# Patient Record
Sex: FEMALE | Race: WHITE | Employment: OTHER | URBAN - METROPOLITAN AREA
[De-identification: names, ages, dates, MRNs, and addresses within clinical notes are randomized per-mention and may not be internally consistent; named-entity substitution may affect disease eponyms.]

---

## 2017-02-01 RX ORDER — TAMOXIFEN CITRATE 20 MG/1
20 TABLET ORAL DAILY
Qty: 90 TAB | Refills: 3 | Status: SHIPPED | OUTPATIENT
Start: 2017-02-01 | End: 2018-03-28 | Stop reason: SDUPTHER

## 2017-03-13 ENCOUNTER — HOSPITAL ENCOUNTER (OUTPATIENT)
Dept: MAMMOGRAPHY | Age: 72
Discharge: HOME | End: 2017-03-13
Attending: NURSE PRACTITIONER
Payer: MEDICARE

## 2017-03-13 DIAGNOSIS — C50.411 MALIGNANT NEOPLASM OF UPPER-OUTER QUADRANT OF RIGHT FEMALE BREAST (HCC): ICD-10-CM

## 2017-03-13 DIAGNOSIS — Z78.0 POST-MENOPAUSAL: ICD-10-CM

## 2017-03-13 PROCEDURE — 77080 DXA BONE DENSITY AXIAL: CPT

## 2017-05-15 ENCOUNTER — OFFICE VISIT (OUTPATIENT)
Dept: ONCOLOGY | Age: 72
End: 2017-05-15

## 2017-05-15 ENCOUNTER — DOCUMENTATION ONLY (OUTPATIENT)
Dept: ONCOLOGY | Age: 72
End: 2017-05-15

## 2017-05-15 VITALS
HEIGHT: 61 IN | OXYGEN SATURATION: 96 % | HEART RATE: 72 BPM | WEIGHT: 180.6 LBS | DIASTOLIC BLOOD PRESSURE: 69 MMHG | SYSTOLIC BLOOD PRESSURE: 144 MMHG | TEMPERATURE: 97 F | RESPIRATION RATE: 20 BRPM | BODY MASS INDEX: 34.1 KG/M2

## 2017-05-15 DIAGNOSIS — M79.604 PAIN OF RIGHT LOWER EXTREMITY: ICD-10-CM

## 2017-05-15 DIAGNOSIS — I89.0 LYMPHEDEMA: ICD-10-CM

## 2017-05-15 DIAGNOSIS — I10 BENIGN ESSENTIAL HTN: ICD-10-CM

## 2017-05-15 DIAGNOSIS — C50.411 MALIGNANT NEOPLASM OF UPPER-OUTER QUADRANT OF RIGHT FEMALE BREAST (HCC): Primary | ICD-10-CM

## 2017-05-15 RX ORDER — LATANOPROST 50 UG/ML
SOLUTION/ DROPS OPHTHALMIC
COMMUNITY
Start: 2017-04-12 | End: 2017-11-20 | Stop reason: ALTCHOICE

## 2017-05-15 RX ORDER — ROSUVASTATIN CALCIUM 10 MG/1
10 TABLET, COATED ORAL
COMMUNITY

## 2017-05-15 NOTE — PROGRESS NOTES
[address], [address] [facility]  [address]  W: [phone]  F: [phone]     f/u HEME/ONC CONSULT    Reason for visit: evaluation for treatment for   Breast Cancer    Consulting physician:  Dr. Emanuel Brownlee    HPI:   Priya Noland is a 66 y.o.  female who I was asked to see in consultation at the request of Dr. Carmela Saravia for evaluation for systemic therapy for breast cancer. She had left sided breast cancer in 1992, path report not available to me, treated with lumpectomy for a \"walnut sized tumor,\" 0/32 LN involved, followed by radiation and chemotherapy (for possibly 6 months, so maybe CMF?) and then tamoxifen for 2 years (stopped by her due to not having evidence that more than 2 years was beneficial). She had an abnormal mammogram leading to a RIGHT breast biopsy on 1/19/2015, 0.7 cm of invasive carcinoma with mucinous features, gr 1, with DCIS, ER + at 100% and IA + at 30% for both invasive and DCIS component, HER 2 negative at IHC 0, ki67 18%. Right lumpectomy on 2/10/15 shows invasive ductal carcinoma, gr 1, no LVI, 0/2 LN involved, solid and cribriform DCIS focally present, negative but close DCIS margin. S/p xrt 3/24/15-4/14/15  Started anastrozole 4/2015-4/19/16 stopped due to joint pain  Restarted anastrozole 5/1/16-6/13/16 stopped due to joint pain  Letrozole 6/27/16-7/18/16  Tamoxifen 8/1/16-    Interval history: In today for follow up. Today complains of: gr 1 fatigue, gr 1 insomnia, gr 1 hot flashes, gr 2 anxiety,  Occasional sob, gr 1 neuropathy. Complains of right lower leg and lower groin area pain when she lies down, 2/10. Taking Tamoxifen, tolerating well. Her son-in-law passed away this past week due to lung cancer. DX   Encounter Diagnoses   Name Primary?     Malignant neoplasm of upper-outer quadrant of right female breast ([address] Yes    Pain of right lower extremity     Benign essential HTN     Lymphedema         Past Medical History:   Diagnosis Date    Breast CA [facility]) 2015    right breast    Cancer ([address] 1992    LEFT breast  followed by chemo and Tamoxifen    GERD (gastroesophageal reflux disease)     Hypercholesterolemia     Hypertension      Past Surgical History:   Procedure Laterality Date    HX BREAST LUMPECTOMY  1992    LEFT    HX HYSTERECTOMY      Took out part of both ovaries    HX OOPHORECTOMY Bilateral     partial    HX TONSILLECTOMY      age 25     Social History     Social History    Marital status:      Spouse name: N/A    Number of children: N/A    Years of education: N/A     Social History Main Topics    Smoking status: Never Smoker    Smokeless tobacco: Never Used    Alcohol use 0.5 oz/week     1 Cans of beer per week      Comment: beer    Drug use: No    Sexual activity: Not Asked     Other Topics Concern    None     Social History Narrative     Family History   Problem Relation Age of Onset    Cancer Mother      Lung CA, was a smoker    Heart Disease Father        Current Outpatient Prescriptions   Medication Sig Dispense Refill    latanoprost (XALATAN) 0.005 % ophthalmic solution       rosuvastatin (CRESTOR) 10 mg tablet Take 10 mg by mouth nightly.  tamoxifen (NOLVADEX) 20 mg tablet Take 1 Tab by mouth daily. 90 Tab 3    vitamin e 400 unit tab Take  by mouth daily.  ascorbic acid (VITAMIN C) 500 mg tablet Take 500 mg by mouth daily.  metoprolol succinate (TOPROL-XL) 50 mg XL tablet Take 75 mg by mouth daily.  GLUCOSAMINE HCL/CHONDR EWBSTER A NA (OSTEO BI-FLEX PO) Take 2 Tabs by mouth daily.  aspirin 81 mg chewable tablet Take 81 mg by mouth daily.  calcium-cholecalciferol, d3, (CALCIUM 600 + D) 600-125 mg-unit tab Take 2 Tabs by mouth Daily (before breakfast).  omeprazole (PRILOSEC) 10 mg capsule Take 10 mg by mouth daily.  naproxen sodium (ALEVE) 220 mg cap Take  by mouth.       diazepam (VALIUM) 5 mg tablet Take 1 Tab by mouth every six (6) hours as needed for Anxiety. 25 Tab 0       Allergies   Allergen Reactions    Sulfa (Sulfonamide Antibiotics) Rash       Review of Systems    A comprehensive review of systems was performed and all systems were negative except for HPI and for the symptom report form, reviewed and scanned in.    Objective:  Physical Exam:  Visit Vitals    /69    Pulse 72    Temp 97 °F (36.1 °C) (Temporal)    Resp 20    Ht 5' 1\" (1.549 m)    Wt 180 lb 9.6 oz (81.9 kg)    SpO2 96%    BMI 34.12 kg/m2       General:  Alert, cooperative, no distress, appears stated age. Head:  Normocephalic, without obvious abnormality, atraumatic. Eyes:  Conjunctivae/corneas clear. PERRL, EOMs intact. Throat: Lips, mucosa, and tongue normal.    Neck: Supple, symmetrical, trachea midline, no adenopathy, thyroid: no enlargement/tenderness/nodules   Back:   Symmetric, no curvature. ROM normal. No CVA tenderness. Lungs:   Clear to auscultation bilaterally. Chest wall:  No tenderness or deformity. Heart:  Regular rate and rhythm, S1, S2 normal, no murmur, click, rub or gallop. Abdomen:   Soft, non-tender. Bowel sounds normal. No masses,  No organomegaly. Extremities: Extremities normal, atraumatic, no cyanosis. Left arm lymphedema. Skin: Skin color, texture, turgor normal. No rashes or lesions. Lymph nodes: Cervical, supraclavicular, and axillary nodes normal.   Neurologic: CNII-XII intact. Diagnostic Imaging     No results found for this or any previous visit. 3/10/15 dexa  REPORT:  Bone Mineral Density  Indication: screening for osteoporosis  Age: 68  Sex: Female. Menopause status: postmenopausal.  Hormone replacement therapy: No   Risk factors for fall: frequent falls   Risk factors for osteoporosis: AI  Current medication for osteoporosis: Calcium and vitamin D.   Comparison: 2002  Technique: Imaging was performed on the [address] [place] [address] meta-analysis fracture risk calculator (FRAX) analysis   was performed for 10 year fracture risk probability assessment  Excluded sites: 0  Findings:  Fractures identified on Lateral scanogram: 0  Lumbar spine: L1-4  Bone mineral density (gm/cm2): 1.250  % of peak bone mass: 105  % for age matched controls: 80  T score: 0.4  Z score: 1.5  Hip: Right femoral neck  Bone mineral density (gm/cm2): 0.993  % of peak bone mass: 96  % for age matched controls: 80  T score: -0.3  Z score: 1  IMPRESSION:  This patient is normal using the World Health Organization criteria  As compared to the prior study, there has been a significant 5.3% increase   in the left total hip, a significant 19.2% increase in the lumbar spine  10 year probability of major osteoporotic fracture: 7.2%  10 year probability of hip fracture: 0.5%    10/19/15 bilateral mammogram  FINDINGS: Bilateral digital diagnostic mammography was performed, and is   interpreted in conjunction with a computer assisted detection (CAD) system. Within the upper outer quadrant of the right breast, there is a large   rounded mass, possibly representing a postoperative seroma. The left breast   postoperative changes are stable. No suspicious calcifications are   identified. There is no skin thickening or nipple retraction. Targeted evaluation of the upper-outer quadrant of the right breast is   performed. Within the 10:00 position of the right breast, there is a 4.8 CM   by 3.4 CM by 5.9 CM low circumscribed complex avascular collection, likely   representing a seroma. IMPRESSION: BI-RADS 3. Probable benign finding. Recommend right breast 6   month short interval followup, unless earlier clinical indication. Findings   were discussed with the patient at the time of interpretation. 4/18/16 R mammo   IMPRESSION:  1. BI-RADS category 3, probably benign. 2. The patient should return in 6 months for bilateral mammography. 3. She was informed. 10/31/16 [place] mammo: negative, f/u in 1 year.      3/13/17 dexa  Lumbar spine: L1-L4  Bone mineral density (gm/cm2): 1.183  % of peak bone mass: 99  % for age matched controls: 80  T score: -0.1  Z score: 1.1      Total hip: Left  Bone mineral density (gm/cm2): 1.064  % of peak bone mass: 106  % for age matched controls: 80  T score: 0.4  Z score: 1.7     Femoral neck: Left  Bone mineral density (gm/cm2): 0.961  % of peak bone mass: 93  % for age matched controls: 80  T score: -0.6  Z score: 0.9     Forearm: Left  Bone mineral density (gm/cm2): 0.789  % of peak bone mass: 90  % for age matched controls: 80  T score: -1.0  Z score: 1.1                  IMPRESSION  Impression:      This patient is normal using the World Health Organization criteria    Lab Results  No results found for: WBC    No results found for: NA    . Assessment/Plan:  66 y.o. female with [id number] right breast cancer, ER +, ND +, HER 2 negative, gr 1, 0/2 LN involved, 0.7 cm. PS 0    1. Right Breast cancer stage: IA    Hormonal therapy: administered     With no further therapy, her RR is 14%. Treatment with endocrine therapy will decrease her RR to 7%. Chemotherapy would only add an 1% benefit. No evidence of recurrence, continue tamoxifen    Bilateral mammogram due in October 2017, ordered. Last eye exam: 1/12/17  Last gyn exam: s/p hysterectomy    Will see her back in 6 months for follow up. 2. Hypertension: stable. On metroprolol    3. Emotional well being: She is coping well with her disease and has great support. 4. RLE pain:  Will obtain RLE doppler, though likely a chronic pain. 5. Lung nodule: Dr. Jess Viera following. Previously, discussed that she gets annual CXR to follow a nodule in her lungs. Discussed low dose CT scan to follow if her risk is high enough. We discussed getting a CT today however patient is not interested at this time. 6. Back pain: improved, using aleve with relief. Pt not interested in seeing orthopedics or getting scan at this time.  She will call us if this worsens. Will monitor. 7. Lymphedema: in left arm. Refer to lymphedema today. Thank you for this consult. All of the patient's questions were answered today. There are no Patient Instructions on file for this visit. Follow-up Disposition:  Return in about 6 months (around 11/15/2017).     Lan Aviles MD

## 2017-05-15 NOTE — MR AVS SNAPSHOT
Visit Information Date & Time Provider Department Dept. Phone Encounter #  
 5/15/2017  2:15 PM Nathaniel Mcduffie at [address] [place] [address] Rd [id number] Follow-up Instructions Return in about 6 months (around 11/15/2017). Follow-up and Disposition History Your Appointments 11/27/2017  8:00 AM  
ESTABLISHED PATIENT with Maxine Foreman MD  
[facility]) Appt Note: 6 mo fu Volanda Dynes [address] [facility], [address] [facility] [id number]  
[phone]  
  
   
 [address] [facility], [address] [facility] Upcoming Health Maintenance Date Due DTaP/Tdap/Td series (1 - Tdap) 10/1/1964 FOBT Q 1 YEAR AGE 50-75 10/1/1993 ZOSTER VACCINE AGE 60> 10/1/2003 GLAUCOMA SCREENING Q2Y 10/1/2008 Pneumococcal 65+ High/Highest Risk (1 of 2 - PCV13) 10/1/2008 MEDICARE YEARLY EXAM 10/1/2008 INFLUENZA AGE 9 TO ADULT 8/1/2017 BREAST CANCER SCRN MAMMOGRAM 10/31/2018 Allergies as of 5/15/2017  Review Complete On: 5/15/2017 By: Maxine Foreman MD  
  
 Severity Noted Reaction Type Reactions Sulfa (Sulfonamide Antibiotics)  10/21/2011    Rash Current Immunizations  Never Reviewed No immunizations on file. Not reviewed this visit You Were Diagnosed With   
  
 Codes Comments Malignant neoplasm of upper-outer quadrant of right female breast ([facility] [address]    -  Primary ICD-10-CM: C50.411 ICD-9-CM: 174.4 Pain of right lower extremity     ICD-10-CM: M79.604 ICD-9-CM: 729.5 Benign essential HTN     ICD-10-CM: I10 
ICD-9-CM: 401.1 Lymphedema     ICD-10-CM: I89.0 ICD-9-CM: 360.8 Vitals BP Pulse Temp Resp Height(growth percentile) Weight(growth percentile) 144/69 72 97 °F (36.1 °C) (Temporal) 20 5' 1\" (1.549 m) 180 lb 9.6 oz (81.9 kg) SpO2 BMI OB Status Smoking Status 96% 34.12 kg/m2 Hysterectomy Never Smoker Vitals History BMI and BSA Data Body Mass Index Body Surface Area  
 34.12 kg/m 2 1.88 m 2 Preferred Pharmacy Pharmacy Name Phone [facility] [address] [place] [phone] Your Updated Medication List  
  
   
This list is accurate as of: 5/15/17  2:51 PM.  Always use your most recent med list.  
  
  
  
  
 ALEVE 220 mg Cap Generic drug:  naproxen sodium Take  by mouth. ascorbic acid (vitamin C) 500 mg tablet Commonly known as:  VITAMIN C Take 500 mg by mouth daily. aspirin 81 mg chewable tablet Take 81 mg by mouth daily. CALCIUM 600 + D 600-125 mg-unit Tab Generic drug:  calcium-cholecalciferol (d3) Take 2 Tabs by mouth Daily (before breakfast). diazePAM 5 mg tablet Commonly known as:  VALIUM Take 1 Tab by mouth every six (6) hours as needed for Anxiety. latanoprost 0.005 % ophthalmic solution Commonly known as:  XALATAN  
  
 metoprolol succinate 50 mg XL tablet Commonly known as:  TOPROL-XL Take 75 mg by mouth daily. omeprazole 10 mg capsule Commonly known as:  PRILOSEC Take 10 mg by mouth daily. OSTEO BI-FLEX PO Take 2 Tabs by mouth daily. rosuvastatin 10 mg tablet Commonly known as:  CRESTOR Take 10 mg by mouth nightly. tamoxifen 20 mg tablet Commonly known as:  NOLVADEX Take 1 Tab by mouth daily. vitamin e 400 unit Tab Take  by mouth daily. Follow-up Instructions Return in about 6 months (around 11/15/2017). To-Do List   
 05/17/2017 Imaging:  DUPLEX LOWER EXT VENOUS RIGHT   
  
 10/31/2017 Imaging:  LACEY MAMMOGRAM DIAG BILAT SAME DAY INCL CAD Introducing [facility]! Dear Ade Saravia: Thank you for requesting a [employer] account. Our records indicate that you have previously registered for a [employer] account but its currently inactive. Please call our [employer] support line at [phone]. Additional Information If you have questions, please visit the Frequently Asked Questions section of the [employer] website at [email or web address] Remember, [employer] is NOT to be used for urgent needs. For medical emergencies, dial 911. Now available from your iPhone and Android! Please provide this summary of care documentation to your next provider. Your primary care clinician is listed as Carlotta Palmer. If you have any questions after today's visit, please call [phone].

## 2017-05-22 ENCOUNTER — HOSPITAL ENCOUNTER (OUTPATIENT)
Dept: ULTRASOUND IMAGING | Age: 72
Discharge: HOME | End: 2017-05-22
Attending: INTERNAL MEDICINE
Payer: MEDICARE

## 2017-05-22 DIAGNOSIS — C50.411 MALIGNANT NEOPLASM OF UPPER-OUTER QUADRANT OF RIGHT FEMALE BREAST (HCC): ICD-10-CM

## 2017-05-22 DIAGNOSIS — M79.604 PAIN OF RIGHT LOWER EXTREMITY: ICD-10-CM

## 2017-05-22 PROCEDURE — 93971 EXTREMITY STUDY: CPT

## 2017-11-07 DIAGNOSIS — C50.411 MALIGNANT NEOPLASM OF UPPER-OUTER QUADRANT OF RIGHT FEMALE BREAST, UNSPECIFIED ESTROGEN RECEPTOR STATUS (HCC): Primary | ICD-10-CM

## 2017-11-20 ENCOUNTER — HOSPITAL ENCOUNTER (OUTPATIENT)
Dept: MAMMOGRAPHY | Age: 72
Discharge: HOME | End: 2017-11-20
Attending: SURGERY
Payer: MEDICARE

## 2017-11-20 ENCOUNTER — OFFICE VISIT (OUTPATIENT)
Dept: SURGERY | Age: 72
End: 2017-11-20

## 2017-11-20 VITALS
WEIGHT: 181 LBS | DIASTOLIC BLOOD PRESSURE: 68 MMHG | HEART RATE: 56 BPM | BODY MASS INDEX: 34.17 KG/M2 | SYSTOLIC BLOOD PRESSURE: 143 MMHG | HEIGHT: 61 IN

## 2017-11-20 DIAGNOSIS — Z17.0 MALIGNANT NEOPLASM OF UPPER-OUTER QUADRANT OF RIGHT BREAST IN FEMALE, ESTROGEN RECEPTOR POSITIVE (HCC): Primary | ICD-10-CM

## 2017-11-20 DIAGNOSIS — Z85.3 HISTORY OF LEFT BREAST CANCER: ICD-10-CM

## 2017-11-20 DIAGNOSIS — C50.411 MALIGNANT NEOPLASM OF UPPER-OUTER QUADRANT OF RIGHT BREAST IN FEMALE, ESTROGEN RECEPTOR POSITIVE (HCC): Primary | ICD-10-CM

## 2017-11-20 DIAGNOSIS — C50.411 MALIGNANT NEOPLASM OF UPPER-OUTER QUADRANT OF RIGHT FEMALE BREAST, UNSPECIFIED ESTROGEN RECEPTOR STATUS (HCC): ICD-10-CM

## 2017-11-20 PROCEDURE — 77066 DX MAMMO INCL CAD BI: CPT

## 2017-11-20 NOTE — PROGRESS NOTES
HISTORY OF PRESENT ILLNESS  Carolyn Lyles is a 74 y.o. female. HPI  ESTABLISHED patient here for follow-up of RIGHT breast cancer, S/P lumpectomy in 2015. Also has a history of LEFT breast CA in 1992. Is currently on Tamoxifen and is doing well on this. Could not tolerate the AIs due to bone pain. Has no complaints today. Feels well other than some low back pain.       02/10/2015: RIGHT lumpectomy and SNBx of gr1 invasive ductal carcinoma, 0.7 cm with DCIS. 0/2 LN involved. ER+100%/KS+30% Her2-. Clear margins.   03/24/2015 - /4/24/2015: S/p XRT  04/2015 - 04/19/2016: Anastrozole (stopped due to joint pain)  05/01/2016 - 06/13/2016: Restarted anastrozole (stopped due to joint pain)  06/27/2016 - 07/18/206: Letrozole   08/01/2016 - : Tamoxifen    BILATERAL diagnostic mammogram today at [address] [place] [address], BIRADS 2, benign. Past Medical History:   Diagnosis Date    Breast CA [facility]) 2015    right breast    Cancer ([address] 1992    LEFT breast  followed by chemo and Tamoxifen    GERD (gastroesophageal reflux disease)     Hypercholesterolemia     Hypertension        Past Surgical History:   Procedure Laterality Date    HX BREAST BIOPSY Left 1992    dcis    HX BREAST BIOPSY Right 2015    dcis    HX BREAST LUMPECTOMY  1992    LEFT    HX HYSTERECTOMY      Took out part of both ovaries    HX OOPHORECTOMY Bilateral     partial    HX TONSILLECTOMY      age 25       Social History     Social History    Marital status:      Spouse name: N/A    Number of children: N/A    Years of education: N/A     Occupational History    Not on file.      Social History Main Topics    Smoking status: Never Smoker    Smokeless tobacco: Never Used    Alcohol use 0.5 oz/week     1 Cans of beer per week      Comment: beer    Drug use: No    Sexual activity: Not on file     Other Topics Concern    Not on file     Social History Narrative       Current Outpatient Prescriptions on File Prior to Visit   Medication Sig Dispense Refill    rosuvastatin (CRESTOR) 10 mg tablet Take 10 mg by mouth nightly.  tamoxifen (NOLVADEX) 20 mg tablet Take 1 Tab by mouth daily. 90 Tab 3    vitamin e 400 unit tab Take  by mouth daily.  ascorbic acid (VITAMIN C) 500 mg tablet Take 500 mg by mouth daily.  metoprolol succinate (TOPROL-XL) 50 mg XL tablet Take 75 mg by mouth daily.  naproxen sodium (ALEVE) 220 mg cap Take  by mouth.  GLUCOSAMINE HCL/CHONDR WEBSTER A NA (OSTEO BI-FLEX PO) Take 2 Tabs by mouth daily.  aspirin 81 mg chewable tablet Take 81 mg by mouth daily.  calcium-cholecalciferol, d3, (CALCIUM 600 + D) 600-125 mg-unit tab Take 2 Tabs by mouth Daily (before breakfast).  omeprazole (PRILOSEC) 10 mg capsule Take 10 mg by mouth daily.  diazepam (VALIUM) 5 mg tablet Take 1 Tab by mouth every six (6) hours as needed for Anxiety. 25 Tab 0     No current facility-administered medications on file prior to visit. Allergies   Allergen Reactions    Sulfa (Sulfonamide Antibiotics) Rash       OB History      Para Term  AB Living    3 3        SAB TAB Ectopic Molar Multiple Live Births    0             Obstetric Comments    Menarche:  4. LMP: 30.  # of Children:  3. Age at Delivery of First Child:  11.   Hysterectomy/oophorectomy:  YES/NO. Breast Bx:  Yes. Hx of Breast Feeding:  No.  BCP:  No. Hormone therapy:  No.          ROS  Constitutional: Negative    HENT: Negative. Eyes: Negative. Respiratory: Negative. Cardiovascular: Negative. Gastrointestinal: Negative. Genitourinary: Negative. Musculoskeletal: Negative. Skin: Negative. Neurological: Negative. Endo/Heme/Allergies: Negative. Psychiatric/Behavioral: Negative. Physical Exam   Cardiovascular: Normal rate and normal heart sounds. Pulmonary/Chest: Breath sounds normal. Right breast exhibits no inverted nipple, no mass, no nipple discharge, no skin change and no tenderness.  Left breast exhibits no inverted nipple, no mass, no nipple discharge, no skin change and no tenderness. Breasts are symmetrical.       Lymphadenopathy:        Right cervical: No superficial cervical, no deep cervical and no posterior cervical adenopathy present. Left cervical: No superficial cervical, no deep cervical and no posterior cervical adenopathy present. Right axillary: No pectoral and no lateral adenopathy present. Left axillary: No pectoral and no lateral adenopathy present. ASSESSMENT and PLAN    ICD-10-CM ICD-9-CM    1. Malignant neoplasm of upper-outer quadrant of right breast in female, estrogen receptor positive (HCC) C50.411 174.4     Z17.0 V86.0    2. History of left breast cancer Z85.3 V10.3      Pt with hx of BILATERAL breast cancer and doing well. Today's mammo looks good. Repeat mammo and f/u with Mallory Ojeda NP in 1 year. This plan was reviewed with the patient and patient agrees. All questions were answered.     Written by [facility], as dictated by Dr. Mack Cárdenas MD.

## 2017-11-20 NOTE — COMMUNICATION BODY
HISTORY OF PRESENT ILLNESS  Rajani Hatfield is a 74 y.o. female. HPI  ESTABLISHED patient here for follow-up of RIGHT breast cancer, S/P lumpectomy in 2015. Also has a history of LEFT breast CA in 1992. Is currently on Tamoxifen and is doing well on this. Could not tolerate the AIs due to bone pain. Has no complaints today. Feels well other than some low back pain.       02/10/2015: RIGHT lumpectomy and SNBx of gr1 invasive ductal carcinoma, 0.7 cm with DCIS. 0/2 LN involved. ER+100%/[place]+30% Her2-. Clear margins.   03/24/2015 - /4/24/2015: S/p XRT  04/2015 - 04/19/2016: Anastrozole (stopped due to joint pain)  05/01/2016 - 06/13/2016: Restarted anastrozole (stopped due to joint pain)  06/27/2016 - 07/18/206: Letrozole   08/01/2016 - : Tamoxifen    BILATERAL diagnostic mammogram today at [address] [place] [address], BIRADS 2, benign. Past Medical History:   Diagnosis Date    Breast CA [facility]) 2015    right breast    Cancer ([address] 1992    LEFT breast  followed by chemo and Tamoxifen    GERD (gastroesophageal reflux disease)     Hypercholesterolemia     Hypertension        Past Surgical History:   Procedure Laterality Date    HX BREAST BIOPSY Left 1992    dcis    HX BREAST BIOPSY Right 2015    dcis    HX BREAST LUMPECTOMY  1992    LEFT    HX HYSTERECTOMY      Took out part of both ovaries    HX OOPHORECTOMY Bilateral     partial    HX TONSILLECTOMY      age 25       Social History     Social History    Marital status:      Spouse name: N/A    Number of children: N/A    Years of education: N/A     Occupational History    Not on file.      Social History Main Topics    Smoking status: Never Smoker    Smokeless tobacco: Never Used    Alcohol use 0.5 oz/week     1 Cans of beer per week      Comment: beer    Drug use: No    Sexual activity: Not on file     Other Topics Concern    Not on file     Social History Narrative       Current Outpatient Prescriptions on File Prior to Visit   Medication Sig Dispense Refill    rosuvastatin (CRESTOR) 10 mg tablet Take 10 mg by mouth nightly.  tamoxifen (NOLVADEX) 20 mg tablet Take 1 Tab by mouth daily. 90 Tab 3    vitamin e 400 unit tab Take  by mouth daily.  ascorbic acid (VITAMIN C) 500 mg tablet Take 500 mg by mouth daily.  metoprolol succinate (TOPROL-XL) 50 mg XL tablet Take 75 mg by mouth daily.  naproxen sodium (ALEVE) 220 mg cap Take  by mouth.  GLUCOSAMINE HCL/CHONDR WEBSTER A NA (OSTEO BI-FLEX PO) Take 2 Tabs by mouth daily.  aspirin 81 mg chewable tablet Take 81 mg by mouth daily.  calcium-cholecalciferol, d3, (CALCIUM 600 + D) 600-125 mg-unit tab Take 2 Tabs by mouth Daily (before breakfast).  omeprazole (PRILOSEC) 10 mg capsule Take 10 mg by mouth daily.  diazepam (VALIUM) 5 mg tablet Take 1 Tab by mouth every six (6) hours as needed for Anxiety. 25 Tab 0     No current facility-administered medications on file prior to visit. Allergies   Allergen Reactions    Sulfa (Sulfonamide Antibiotics) Rash       OB History      Para Term  AB Living    3 3        SAB TAB Ectopic Molar Multiple Live Births    0             Obstetric Comments    Menarche:  4. LMP: 28.  # of Children:  3. Age at Delivery of First Child:  11.   Hysterectomy/oophorectomy:  YES/NO. Breast Bx:  Yes. Hx of Breast Feeding:  No.  BCP:  No. Hormone therapy:  No.          ROS  Constitutional: Negative    HENT: Negative. Eyes: Negative. Respiratory: Negative. Cardiovascular: Negative. Gastrointestinal: Negative. Genitourinary: Negative. Musculoskeletal: Negative. Skin: Negative. Neurological: Negative. Endo/Heme/Allergies: Negative. Psychiatric/Behavioral: Negative. Physical Exam   Cardiovascular: Normal rate and normal heart sounds. Pulmonary/Chest: Breath sounds normal. Right breast exhibits no inverted nipple, no mass, no nipple discharge, no skin change and no tenderness.  Left breast exhibits no inverted nipple, no mass, no nipple discharge, no skin change and no tenderness. Breasts are symmetrical.       Lymphadenopathy:        Right cervical: No superficial cervical, no deep cervical and no posterior cervical adenopathy present. Left cervical: No superficial cervical, no deep cervical and no posterior cervical adenopathy present. Right axillary: No pectoral and no lateral adenopathy present. Left axillary: No pectoral and no lateral adenopathy present. ASSESSMENT and PLAN    ICD-10-CM ICD-9-CM    1. Malignant neoplasm of upper-outer quadrant of right breast in female, estrogen receptor positive (HCC) C50.411 174.4     Z17.0 V86.0    2. History of left breast cancer Z85.3 V10.3      Pt with hx of BILATERAL breast cancer and doing well. Today's mammo looks good. Repeat mammo and f/u with Naveed Archibald NP in 1 year. This plan was reviewed with the patient and patient agrees. All questions were answered.     Written by Vadim Caban, as dictated by Dr. Claudeen Kanner, MD.

## 2017-11-20 NOTE — PROGRESS NOTES
HISTORY OF PRESENT ILLNESS  Alfredo Gary is a 74 y.o. female. HPI   ESTABLISHED patient here for follow-up of RIGHT breast cancer, S/P lumpectomy in 2015. Also has a history of LEFT breast CA in 1992. Is currently on Tamoxifen and is doing well on this. Could not tolerate the AIs due to bone pain. Has no complaints today. Feels well other than some low back pain. 02/10/2015: RIGHT lumpectomy and SNBx of gr1 invasive ductal carcinoma, 0.7 cm with DCIS. 0/2 LN involved. ER+100%/[place]+30% Her2-. Clear margins.   03/24/2015 - /4/24/2015: S/p XRT  04/2015 - 04/19/2016: Anastrozole (stopped due to joint pain)  05/01/2016 - 06/13/2016: Restarted anastrozole (stopped due to joint pain)  06/27/2016 - 07/18/206: Letrozole   08/01/2016 - : Tamoxifen  BILATERAL diagnostic mammogram today at [address] [place] [address], BIRADS 2, benign.       ROS    Physical Exam    ASSESSMENT and PLAN  {ASSESSMENT/PLAN:75420}

## 2017-11-20 NOTE — MR AVS SNAPSHOT
Visit Information Date & Time Provider Department Dept. Phone Encounter #  
 11/20/2017  2:41 AM Evon Whaley MD [facility] [phone] [id number] Follow-up Instructions Return in about 1 year (around 11/20/2018) for with Marya Tran NP, with mammogram.  
 Follow-up and Disposition History Your Appointments 11/27/2017  8:00 AM  
ESTABLISHED PATIENT with Kelsie Rodríguez MD  
[facility] at [address]) Appt Note: 6 mo fu Elnor Patches [address] [facility], [address] [place] [address] [facility] [address]  
[phone]  
  
   
 [address] [facility], [address]  
  
    
 11/19/2018 11:40 AM  
Follow Up with Evon Whaley MD  
[facility] [address] [place] [address]) Appt Note: annual follow up per /cp?/[facility] [address] [facility] 99 [address]  
  
   
 [place] [address] [place] [address] [facility] Upcoming Health Maintenance Date Due DTaP/Tdap/Td series (1 - Tdap) 10/1/1964 FOBT Q 1 YEAR AGE 50-75 10/1/1993 ZOSTER VACCINE AGE 60> 8/1/2003 GLAUCOMA SCREENING Q2Y 10/1/2008 Pneumococcal 65+ High/Highest Risk (1 of 2 - PCV13) 10/1/2008 MEDICARE YEARLY EXAM 10/1/2008 Influenza Age 5 to Adult 8/1/2017 Allergies as of 11/20/2017  Review Complete On: 08/62/5482 By: Evon Whaley MD  
  
 Severity Noted Reaction Type Reactions Sulfa (Sulfonamide Antibiotics)  10/21/2011    Rash Current Immunizations  Never Reviewed No immunizations on file. Not reviewed this visit You Were Diagnosed With   
  
 Codes Comments Malignant neoplasm of upper-outer quadrant of right breast in female, estrogen receptor positive ([facility] [address]    -  Primary ICD-10-CM: C50.411, Z17.0 ICD-9-CM: 174.4, V86.0 History of left breast cancer     ICD-10-CM: Z85.3 ICD-9-CM: V10.3 Vitals BP Pulse Height(growth percentile) Weight(growth percentile) BMI OB Status 143/68 (BP 1 Location: Right arm, BP Patient Position: Sitting) (!) 56 5' 1\" (1.549 m) 181 lb (82.1 kg) 34.2 kg/m2 Hysterectomy Smoking Status Never Smoker BMI and BSA Data Body Mass Index Body Surface Area  
 34.2 kg/m 2 1.88 m 2 Preferred Pharmacy Pharmacy Name Phone [facility] [address] [place] [phone] Your Updated Medication List  
  
   
This list is accurate as of: 11/20/17  1:42 PM.  Always use your most recent med list.  
  
  
  
  
 ALEVE 220 mg Cap Generic drug:  naproxen sodium Take  by mouth. ascorbic acid (vitamin C) 500 mg tablet Commonly known as:  VITAMIN C Take 500 mg by mouth daily. aspirin 81 mg chewable tablet Take 81 mg by mouth daily. CALCIUM 600 + D 600-125 mg-unit Tab Generic drug:  calcium-cholecalciferol (d3) Take 2 Tabs by mouth Daily (before breakfast). diazePAM 5 mg tablet Commonly known as:  VALIUM Take 1 Tab by mouth every six (6) hours as needed for Anxiety. FLUAD 1339-3695 (65 YR UP)(PF) Syrg injection Generic drug:  influenza vaccine 2017-18 (65 yrs+)(PF)  
inject 0.5 milliliter intramuscularly LUMIGAN OP Apply  to eye.  
  
 metoprolol succinate 50 mg XL tablet Commonly known as:  TOPROL-XL Take 75 mg by mouth daily. omeprazole 10 mg capsule Commonly known as:  PRILOSEC Take 10 mg by mouth daily. OSTEO BI-FLEX PO Take 2 Tabs by mouth daily. rosuvastatin 10 mg tablet Commonly known as:  CRESTOR Take 10 mg by mouth nightly. tamoxifen 20 mg tablet Commonly known as:  NOLVADEX Take 1 Tab by mouth daily. vitamin e 400 unit Tab Take  by mouth daily. Follow-up Instructions  Return in about 1 year (around 11/20/2018) for with Bruno Mcclure NP, with mammogram.  
  
To-Do List   
 11/19/2018 10:45 AM  
(Arrive by 10:30 AM) Appointment with [facility] at [facility] ([phone]) Shower or bathe using soap and water. Do not use deodorant, powder, perfumes, or lotion the day of your exam.  If your prior mammograms were not performed at [facility] 6 please bring films with you or forward prior images 2 days before your procedure. If patient is not a callback diagnostic, the patient must have an order/script from the physician for the diagnostic. Please bring it on the day of the mammogram or have it faxed to the department. [facility]  [phone] [address]  [phone] [facility] [phone] [facility] [address] [facility] [phone] [facility] [phone] Please arrive 15 minutes prior to appointment to register Introducing [facility] SERVICES! Dear Deejay Centeno: Thank you for requesting a [employer] account. Our records indicate that you have previously registered for a [employer] account but its currently inactive. Please call our [employer] support line at [phone]. Additional Information If you have questions, please visit the Frequently Asked Questions section of the [employer] website at [email or web address] Remember, [email or web address] is NOT to be used for urgent needs. For medical emergencies, dial 911. Now available from your iPhone and Android! Please provide this summary of care documentation to your next provider. Your primary care clinician is listed as Giselle Dimas. If you have any questions after today's visit, please call [phone].

## 2017-11-27 ENCOUNTER — OFFICE VISIT (OUTPATIENT)
Dept: ONCOLOGY | Age: 72
End: 2017-11-27

## 2017-11-27 VITALS
DIASTOLIC BLOOD PRESSURE: 67 MMHG | BODY MASS INDEX: 34.4 KG/M2 | HEART RATE: 55 BPM | WEIGHT: 182.2 LBS | HEIGHT: 61 IN | OXYGEN SATURATION: 98 % | SYSTOLIC BLOOD PRESSURE: 136 MMHG | RESPIRATION RATE: 18 BRPM | TEMPERATURE: 97.2 F

## 2017-11-27 DIAGNOSIS — I89.0 LYMPHEDEMA: ICD-10-CM

## 2017-11-27 DIAGNOSIS — G89.29 CHRONIC MIDLINE LOW BACK PAIN WITHOUT SCIATICA: ICD-10-CM

## 2017-11-27 DIAGNOSIS — I10 BENIGN ESSENTIAL HTN: ICD-10-CM

## 2017-11-27 DIAGNOSIS — Z17.0 MALIGNANT NEOPLASM OF UPPER-OUTER QUADRANT OF RIGHT BREAST IN FEMALE, ESTROGEN RECEPTOR POSITIVE (HCC): Primary | ICD-10-CM

## 2017-11-27 DIAGNOSIS — M54.50 CHRONIC MIDLINE LOW BACK PAIN WITHOUT SCIATICA: ICD-10-CM

## 2017-11-27 DIAGNOSIS — R91.1 LUNG NODULE: ICD-10-CM

## 2017-11-27 DIAGNOSIS — C50.411 MALIGNANT NEOPLASM OF UPPER-OUTER QUADRANT OF RIGHT BREAST IN FEMALE, ESTROGEN RECEPTOR POSITIVE (HCC): Primary | ICD-10-CM

## 2017-11-27 NOTE — PROGRESS NOTES
[address]  301 [facility], 2329 [facility]  [address]  W: [phone]  F: [phone]     f/u HEME/ONC CONSULT    Reason for visit: evaluation for treatment for   Breast Cancer    Consulting physician:  Dr. Murillo Said    HPI:   Rodrigo Rogers is a 74 y.o.  female who I was asked to see in consultation at the request of Dr. Melvina Simms for evaluation for systemic therapy for breast cancer. She had left sided breast cancer in 1992, path report not available to me, treated with lumpectomy for a \"walnut sized tumor,\" 0/32 LN involved, followed by radiation and chemotherapy (for possibly 6 months, so maybe CMF?) and then tamoxifen for 2 years (stopped by her due to not having evidence that more than 2 years was beneficial). She had an abnormal mammogram leading to a RIGHT breast biopsy on 1/19/2015, 0.7 cm of invasive carcinoma with mucinous features, gr 1, with DCIS, ER + at 100% and IL + at 30% for both invasive and DCIS component, HER 2 negative at IHC 0, ki67 18%. Right lumpectomy on 2/10/15 shows invasive ductal carcinoma, gr 1, no LVI, 0/2 LN involved, solid and cribriform DCIS focally present, negative but close DCIS margin. S/p xrt 3/24/15-4/14/15  Started anastrozole 4/2015-4/19/16 stopped due to joint pain  Restarted anastrozole 5/1/16-6/13/16 stopped due to joint pain  Letrozole 6/27/16-7/18/16  Tamoxifen 8/1/16-    Interval history: In today for follow up. Today complains of: gr 1 fatigue, gr 1 insomnia, gr 1 hot flashes, lower back pain 2/10. Gr 1 sob, gr 1 neuropathy, gr 1 vaginal dryness. Taking Tamoxifen, tolerating well. DX   Encounter Diagnoses   Name Primary?     Malignant neoplasm of upper-outer quadrant of right breast in female, estrogen receptor positive ([facility] [address] Yes    Benign essential HTN     Lymphedema     Chronic midline low back pain without sciatica         Past Medical History:   Diagnosis Date    Breast CA ([address] 2015    right breast  Cancer [facility] 1992    LEFT breast  followed by chemo and Tamoxifen    GERD (gastroesophageal reflux disease)     Hypercholesterolemia     Hypertension      Past Surgical History:   Procedure Laterality Date    HX BREAST BIOPSY Left 1992    dcis    HX BREAST BIOPSY Right 2015    dcis    HX BREAST LUMPECTOMY  1992    LEFT    HX HYSTERECTOMY      Took out part of both ovaries    HX OOPHORECTOMY Bilateral     partial    HX TONSILLECTOMY      age 25     Social History     Social History    Marital status:      Spouse name: N/A    Number of children: N/A    Years of education: N/A     Social History Main Topics    Smoking status: Never Smoker    Smokeless tobacco: Never Used    Alcohol use 0.5 oz/week     1 Cans of beer per week      Comment: beer    Drug use: No    Sexual activity: Not Asked     Other Topics Concern    None     Social History Narrative     Family History   Problem Relation Age of Onset    Cancer Mother      Lung CA, was a smoker    Heart Disease Father        Current Outpatient Prescriptions   Medication Sig Dispense Refill    Omega-3 Fatty Acids 60- mg cpDR Take 1 Tab by mouth daily.  BIMATOPROST (LUMIGAN OP) Apply  to eye.  rosuvastatin (CRESTOR) 10 mg tablet Take 10 mg by mouth nightly.  tamoxifen (NOLVADEX) 20 mg tablet Take 1 Tab by mouth daily. 90 Tab 3    vitamin e 400 unit tab Take 1,000 Units by mouth daily.  ascorbic acid (VITAMIN C) 500 mg tablet Take 1,000 mg by mouth daily.  metoprolol succinate (TOPROL-XL) 50 mg XL tablet Take 75 mg by mouth daily.  GLUCOSAMINE HCL/CHONDR WEBSTER A NA (OSTEO BI-FLEX PO) Take 2 Tabs by mouth daily.  aspirin 81 mg chewable tablet Take 81 mg by mouth daily.  calcium-cholecalciferol, d3, (CALCIUM 600 + D) 600-125 mg-unit tab Take 2 Tabs by mouth Daily (before breakfast).  omeprazole (PRILOSEC) 10 mg capsule Take 10 mg by mouth daily.       naproxen sodium (ALEVE) 220 mg cap Take  by mouth.      diazepam (VALIUM) 5 mg tablet Take 1 Tab by mouth every six (6) hours as needed for Anxiety. 25 Tab 0       Allergies   Allergen Reactions    Sulfa (Sulfonamide Antibiotics) Rash       Review of Systems    A comprehensive review of systems was performed and all systems were negative except for HPI and for the symptom report form, reviewed and scanned in.    Objective:  Physical Exam:  Visit Vitals    /67    Pulse (!) 55    Temp 97.2 °F (36.2 °C) (Temporal)    Resp 18    Ht 5' 1\" (1.549 m)    Wt 182 lb 3.2 oz (82.6 kg)    SpO2 98%    BMI 34.43 kg/m2       General:  Alert, cooperative, no distress, appears stated age. Head:  Normocephalic, without obvious abnormality, atraumatic. Eyes:  Conjunctivae/corneas clear. PERRL, EOMs intact. Throat: Lips, mucosa, and tongue normal.    Neck: Supple, symmetrical, trachea midline, no adenopathy, thyroid: no enlargement/tenderness/nodules   Back:   Symmetric, no curvature. ROM normal. No CVA tenderness. Lungs:   Clear to auscultation bilaterally. Chest wall:  No tenderness or deformity. Heart:  Regular rate and rhythm, S1, S2 normal, no murmur, click, rub or gallop. Abdomen:   Soft, non-tender. Bowel sounds normal. No masses,  No organomegaly. Extremities: Extremities normal, atraumatic, no cyanosis. Left arm lymphedema. Skin: Skin color, texture, turgor normal. No rashes or lesions. Lymph nodes: Cervical, supraclavicular, and axillary nodes normal.   Neurologic: CNII-XII intact. Diagnostic Imaging     No results found for this or any previous visit. 3/10/15 dexa  REPORT:  Bone Mineral Density  Indication: screening for osteoporosis  Age: 68  Sex: Female. Menopause status: postmenopausal.  Hormone replacement therapy: No   Risk factors for fall: frequent falls   Risk factors for osteoporosis: AI  Current medication for osteoporosis: Calcium and vitamin D.   Comparison: 2002  Technique: Imaging was performed on the Robert Applebaum MD iDXA World   Health Organization meta-analysis fracture risk calculator (FRAX) analysis   was performed for 10 year fracture risk probability assessment  Excluded sites: 0  Findings:  Fractures identified on Lateral scanogram: 0  Lumbar spine: L1-4  Bone mineral density (gm/cm2): 1.250  % of peak bone mass: 105  % for age matched controls: 80  T score: 0.4  Z score: 1.5  Hip: Right femoral neck  Bone mineral density (gm/cm2): 0.993  % of peak bone mass: 96  % for age matched controls: 80  T score: -0.3  Z score: 1  IMPRESSION:  This patient is normal using the World Health Organization criteria  As compared to the prior study, there has been a significant 5.3% increase   in the left total hip, a significant 19.2% increase in the lumbar spine  10 year probability of major osteoporotic fracture: 7.2%  10 year probability of hip fracture: 0.5%    10/19/15 bilateral mammogram  FINDINGS: Bilateral digital diagnostic mammography was performed, and is   interpreted in conjunction with a computer assisted detection (CAD) system. Within the upper outer quadrant of the right breast, there is a large   rounded mass, possibly representing a postoperative seroma. The left breast   postoperative changes are stable. No suspicious calcifications are   identified. There is no skin thickening or nipple retraction. Targeted evaluation of the upper-outer quadrant of the right breast is   performed. Within the 10:00 position of the right breast, there is a 4.8 CM   by 3.4 CM by 5.9 CM low circumscribed complex avascular collection, likely   representing a seroma. IMPRESSION: BI-RADS 3. Probable benign finding. Recommend right breast 6   month short interval followup, unless earlier clinical indication. Findings   were discussed with the patient at the time of interpretation. 4/18/16 R mammo   IMPRESSION:  1. BI-RADS category 3, probably benign. 2. The patient should return in 6 months for bilateral mammography.   3. She was informed. 10/31/16 [place] mammo: negative, f/u in 1 year. 3/13/17 dexa  Lumbar spine: L1-L4  Bone mineral density (gm/cm2): 1.183  % of peak bone mass: 99  % for age matched controls: 80  T score: -0.1  Z score: 1.1      Total hip: Left  Bone mineral density (gm/cm2): 1.064  % of peak bone mass: 106  % for age matched controls: 80  T score: 0.4  Z score: 1.7     Femoral neck: Left  Bone mineral density (gm/cm2): 0.961  % of peak bone mass: 93  % for age matched controls: 80  T score: -0.6  Z score: 0.9     Forearm: Left  Bone mineral density (gm/cm2): 0.789  % of peak bone mass: 90  % for age matched controls: 80  T score: -1.0  Z score: 1.1                  IMPRESSION  Impression:      This patient is normal using the World Health Organization criteria    11/20/17 mammogram bilat  negative    Lab Results  No results found for: WBC    No results found for: NA    . Assessment/Plan:  74 y.o. female with [id number] right breast cancer, ER +, DC +, HER 2 negative, gr 1, 0/2 LN involved, 0.7 cm. PS 0    1. Right Breast cancer stage: IA    Hormonal therapy: administered     With no further therapy, her RR is 14%. Treatment with endocrine therapy will decrease her RR to 7%. Chemotherapy would only add an 1% benefit. No evidence of recurrence, continue tamoxifen    Bilateral mammogram due in nov 2018    Last eye exam: 10/2017  Last gyn exam: s/p hysterectomy    Will see her back in 6 months for follow up. 2. Hypertension: stable. On metroprolol    3. Emotional well being: She is coping well with her disease and has great support. 4. Lung nodule: Dr. Vinayak Hernandez following. Previously, discussed that she gets annual CXR to follow a nodule in her lungs. Discussed low dose CT scan to follow if her risk is high enough. We discussed getting a CT today however patient is not interested at this time. 5. Back pain, lower, chronic:stable, using aleve with relief.  Pt not interested in seeing orthopedics or getting scan at this time. She will call us if this worsens. Will monitor. 6. Lymphedema: in left arm. Has been seen in  lymphedema     Thank you for this consult. All of the patient's questions were answered today. There are no Patient Instructions on file for this visit. Follow-up Disposition:  Return in about 6 months (around 5/27/2018).     Mat Amador MD

## 2018-03-29 RX ORDER — TAMOXIFEN CITRATE 20 MG/1
TABLET ORAL
Qty: 90 TAB | Refills: 3 | Status: SHIPPED | OUTPATIENT
Start: 2018-03-29 | End: 2019-03-26 | Stop reason: SDUPTHER

## 2018-06-18 ENCOUNTER — TELEPHONE (OUTPATIENT)
Dept: ONCOLOGY | Age: 73
End: 2018-06-18

## 2018-06-18 ENCOUNTER — OFFICE VISIT (OUTPATIENT)
Dept: ONCOLOGY | Age: 73
End: 2018-06-18

## 2018-06-18 VITALS
OXYGEN SATURATION: 99 % | HEART RATE: 55 BPM | SYSTOLIC BLOOD PRESSURE: 148 MMHG | DIASTOLIC BLOOD PRESSURE: 67 MMHG | TEMPERATURE: 96.4 F | RESPIRATION RATE: 20 BRPM | BODY MASS INDEX: 34.55 KG/M2 | WEIGHT: 183 LBS | HEIGHT: 61 IN

## 2018-06-18 DIAGNOSIS — C50.411 MALIGNANT NEOPLASM OF UPPER-OUTER QUADRANT OF RIGHT BREAST IN FEMALE, ESTROGEN RECEPTOR POSITIVE (HCC): Primary | ICD-10-CM

## 2018-06-18 DIAGNOSIS — Z17.0 MALIGNANT NEOPLASM OF UPPER-OUTER QUADRANT OF RIGHT BREAST IN FEMALE, ESTROGEN RECEPTOR POSITIVE (HCC): Primary | ICD-10-CM

## 2018-06-18 RX ORDER — ESCITALOPRAM OXALATE 10 MG/1
10 TABLET ORAL DAILY
COMMUNITY
Start: 2018-05-16

## 2018-06-18 RX ORDER — METOPROLOL SUCCINATE 100 MG/1
100 TABLET, EXTENDED RELEASE ORAL DAILY
COMMUNITY
Start: 2018-04-13

## 2018-06-18 RX ORDER — METOPROLOL SUCCINATE 25 MG/1
25 TABLET, EXTENDED RELEASE ORAL
COMMUNITY
Start: 2018-04-16

## 2018-06-18 NOTE — PROGRESS NOTES
[address]  [address] [facility], [address] [facility]  Yael Black 17  W: [phone]  F: [phone]     f/u HEME/ONC CONSULT    Reason for visit: evaluation for treatment for   Breast Cancer    Consulting physician:  Dr. Dava Spatz    HPI:   Jessika Meredith is a 74 y.o.  female who I was asked to see in consultation at the request of Dr. Goldman Reasons for evaluation for systemic therapy for breast cancer. She had left sided breast cancer in 1992, path report not available to me, treated with lumpectomy for a \"walnut sized tumor,\" 0/32 LN involved, followed by radiation and chemotherapy (for possibly 6 months, so maybe CMF?) and then tamoxifen for 2 years (stopped by her due to not having evidence that more than 2 years was beneficial). She had an abnormal mammogram leading to a RIGHT breast biopsy on 1/19/2015, 0.7 cm of invasive carcinoma with mucinous features, gr 1, with DCIS, ER + at 100% and CT + at 30% for both invasive and DCIS component, HER 2 negative at IHC 0, ki67 18%. Right lumpectomy on 2/10/15 shows invasive ductal carcinoma, gr 1, no LVI, 0/2 LN involved, solid and cribriform DCIS focally present, negative but close DCIS margin. S/p xrt 3/24/15-4/14/15  Started anastrozole 4/2015-4/19/16 stopped due to joint pain  Restarted anastrozole 5/1/16-6/13/16 stopped due to joint pain  Letrozole 6/27/16-7/18/16  Tamoxifen 8/1/16-    Interval history: In today for follow up. Today complains of: gr 1 fatigue, gr 2 insomnia, gr 1 anxiety, 5/10 lower back pain, chronic, gr 3 cough, gr 1 sob, gr 2 rapid heartbeat, gr 1 neuropathy, gr 1 swelling, gr 2 urinary leakage    Complains of cough at night, worse      DX   Encounter Diagnosis   Name Primary?     Malignant neoplasm of upper-outer quadrant of right breast in female, estrogen receptor positive ([address] Yes        Past Medical History:   Diagnosis Date    Breast CA ([address] 2015    right breast    Cancer ([address] 1992    LEFT breast followed by chemo and Tamoxifen    GERD (gastroesophageal reflux disease)     Hypercholesterolemia     Hypertension      Past Surgical History:   Procedure Laterality Date    HX BREAST BIOPSY Left 1992    dcis    HX BREAST BIOPSY Right 2015    dcis    HX BREAST LUMPECTOMY  1992    LEFT    HX HYSTERECTOMY      Took out part of both ovaries    HX OOPHORECTOMY Bilateral     partial    HX TONSILLECTOMY      age 25     Social History     Social History    Marital status:      Spouse name: N/A    Number of children: N/A    Years of education: N/A     Social History Main Topics    Smoking status: Never Smoker    Smokeless tobacco: Never Used    Alcohol use 0.5 oz/week     1 Cans of beer per week      Comment: beer    Drug use: No    Sexual activity: Not Asked     Other Topics Concern    None     Social History Narrative     Family History   Problem Relation Age of Onset    Cancer Mother      Lung CA, was a smoker    Heart Disease Father        Current Outpatient Prescriptions   Medication Sig Dispense Refill    metoprolol succinate (TOPROL-XL) 100 mg tablet       metoprolol succinate (TOPROL-XL) 25 mg XL tablet       escitalopram oxalate (LEXAPRO) 10 mg tablet       tamoxifen (NOLVADEX) 20 mg tablet TAKE ONE TABLET BY MOUTH ONCE DAILY 90 Tab 3    Omega-3 Fatty Acids 60- mg cpDR Take 1 Tab by mouth daily.  BIMATOPROST (LUMIGAN OP) Apply  to eye.  rosuvastatin (CRESTOR) 10 mg tablet Take 10 mg by mouth nightly.  vitamin e 400 unit tab Take 1,000 Units by mouth daily.  ascorbic acid (VITAMIN C) 500 mg tablet Take 1,000 mg by mouth daily.  metoprolol succinate (TOPROL-XL) 50 mg XL tablet Take 75 mg by mouth daily.  naproxen sodium (ALEVE) 220 mg cap Take  by mouth.  GLUCOSAMINE HCL/CHONDR WEBSTER A NA (OSTEO BI-FLEX PO) Take 2 Tabs by mouth daily.  aspirin 81 mg chewable tablet Take 81 mg by mouth daily.       calcium-cholecalciferol, d3, (CALCIUM 600 + D) 600-125 mg-unit tab Take 2 Tabs by mouth Daily (before breakfast).  omeprazole (PRILOSEC) 10 mg capsule Take 10 mg by mouth daily.  diazepam (VALIUM) 5 mg tablet Take 1 Tab by mouth every six (6) hours as needed for Anxiety. 25 Tab 0       Allergies   Allergen Reactions    Sulfa (Sulfonamide Antibiotics) Rash       Review of Systems    A comprehensive review of systems was performed and all systems were negative except for HPI and for the symptom report form, reviewed and scanned in.    Objective:  Physical Exam:  Visit Vitals    /67 (BP 1 Location: Right arm, BP Patient Position: Sitting)  Comment: .  Pulse (!) 55    Temp 96.4 °F (35.8 °C) (Temporal)    Resp 20    Ht 5' 1\" (1.549 m)    Wt 183 lb (83 kg)    SpO2 99%    BMI 34.58 kg/m2       General:  Alert, cooperative, no distress, appears stated age. Head:  Normocephalic, without obvious abnormality, atraumatic. Eyes:  Conjunctivae/corneas clear. PERRL, EOMs intact. Throat: Lips, mucosa, and tongue normal.    Neck: Supple, symmetrical, trachea midline, no adenopathy, thyroid: no enlargement/tenderness/nodules   Back:   Symmetric, no curvature. ROM normal. No CVA tenderness. Lungs:   Clear to auscultation bilaterally. Chest wall:  No tenderness or deformity. Heart:  Regular rate and rhythm, S1, S2 normal, no murmur, click, rub or gallop. Abdomen:   Soft, non-tender. Bowel sounds normal. No masses,  No organomegaly. Extremities: Extremities normal, atraumatic, no cyanosis. Left arm lymphedema. Skin: Skin color, texture, turgor normal. No rashes or lesions. Lymph nodes: Cervical, supraclavicular, and axillary nodes normal.   Neurologic: CNII-XII intact. Diagnostic Imaging     No results found for this or any previous visit. 3/10/15 dexa  REPORT:  Bone Mineral Density  Indication: screening for osteoporosis  Age: 68  Sex: Female.   Menopause status: postmenopausal.  Hormone replacement therapy: No   Risk factors for fall: frequent falls   Risk factors for osteoporosis: AI  Current medication for osteoporosis: Calcium and vitamin D. Comparison: 2002  Technique: Imaging was performed on the [employer] meta-analysis fracture risk calculator (FRAX) analysis   was performed for 10 year fracture risk probability assessment  Excluded sites: 0  Findings:  Fractures identified on Lateral scanogram: 0  Lumbar spine: L1-4  Bone mineral density (gm/cm2): 1.250  % of peak bone mass: 105  % for age matched controls: 80  T score: 0.4  Z score: 1.5  Hip: Right femoral neck  Bone mineral density (gm/cm2): 0.993  % of peak bone mass: 96  % for age matched controls: 80  T score: -0.3  Z score: 1  IMPRESSION:  This patient is normal using the World Health Organization criteria  As compared to the prior study, there has been a significant 5.3% increase   in the left total hip, a significant 19.2% increase in the lumbar spine  10 year probability of major osteoporotic fracture: 7.2%  10 year probability of hip fracture: 0.5%    10/19/15 bilateral mammogram  FINDINGS: Bilateral digital diagnostic mammography was performed, and is   interpreted in conjunction with a computer assisted detection (CAD) system. Within the upper outer quadrant of the right breast, there is a large   rounded mass, possibly representing a postoperative seroma. The left breast   postoperative changes are stable. No suspicious calcifications are   identified. There is no skin thickening or nipple retraction. Targeted evaluation of the upper-outer quadrant of the right breast is   performed. Within the 10:00 position of the right breast, there is a 4.8 CM   by 3.4 CM by 5.9 CM low circumscribed complex avascular collection, likely   representing a seroma. IMPRESSION: BI-RADS 3. Probable benign finding. Recommend right breast 6   month short interval followup, unless earlier clinical indication.  Findings   were discussed with the patient at the time of interpretation. 4/18/16 R mammo   IMPRESSION:  1. BI-RADS category 3, probably benign. 2. The patient should return in 6 months for bilateral mammography. 3. She was informed. 10/31/16 [place] mammo: negative, f/u in 1 year. 3/13/17 dexa  Lumbar spine: L1-L4  Bone mineral density (gm/cm2): 1.183  % of peak bone mass: 99  % for age matched controls: 80  T score: -0.1  Z score: 1.1      Total hip: Left  Bone mineral density (gm/cm2): 1.064  % of peak bone mass: 106  % for age matched controls: 80  T score: 0.4  Z score: 1.7     Femoral neck: Left  Bone mineral density (gm/cm2): 0.961  % of peak bone mass: 93  % for age matched controls: 80  T score: -0.6  Z score: 0.9     Forearm: Left  Bone mineral density (gm/cm2): 0.789  % of peak bone mass: 90  % for age matched controls: 80  T score: -1.0  Z score: 1.1                  IMPRESSION  Impression:      This patient is normal using the World Health Organization criteria    11/20/17 mammogram bilat  negative    Lab Results  No results found for: WBC    No results found for: NA    . Assessment/Plan:  74 y.o. female with [id number] right breast cancer, ER +, NE +, HER 2 negative, gr 1, 0/2 LN involved, 0.7 cm. PS 0    1. Right Breast cancer stage: IA    Hormonal therapy: administered     With no further therapy, her RR is 14%. Treatment with endocrine therapy will decrease her RR to 7%. Chemotherapy would only add an 1% benefit. No evidence of recurrence, continue tamoxifen    Bilateral mammogram due in nov 2018, ordered    Last eye exam: 2/2018  Last gyn exam: s/p hysterectomy    Will see her back in 6 months for follow up. 2. Hypertension: stable. On metroprolol    3. Emotional well being: She is coping well with her disease and has great support. 4. Lung nodule: Dr. Jeff Mills following. Previously, discussed that she gets annual CXR to follow a nodule in her lungs.  Discussed low dose CT scan to follow if her risk is high enough. We discussed getting a CT today however patient is not interested at this time. Had a CXR this year that was negative    5. Back pain, lower, chronic:stable, using aleve with relief. Pt not interested in seeing orthopedics or getting scan at this time. She will call us if this worsens. Will monitor. 6. Lymphedema: in left arm. Has been seen in  lymphedema     7. HM:  Will refer to GI for colonoscopy    Thank you for this consult. All of the patient's questions were answered today. There are no Patient Instructions on file for this visit. Follow-up Disposition:  Return in about 9 months (around 3/18/2019).     Nolan Denise MD

## 2018-06-18 NOTE — TELEPHONE ENCOUNTER
Called pt but unable to leave voicemail. Pt is to call Dr. Rickey Lucinao office to schedule at colonoscopy at [phone]. Will try pt again tomorrow.

## 2018-07-25 ENCOUNTER — TELEPHONE (OUTPATIENT)
Dept: ONCOLOGY | Age: 73
End: 2018-07-25

## 2018-07-25 NOTE — TELEPHONE ENCOUNTER
Called pt and unable to leave a voicemail. Wanted to inform pt that records were sent to 's office and they will contact her to schedule an appointment.

## 2018-08-27 ENCOUNTER — TELEPHONE (OUTPATIENT)
Dept: ONCOLOGY | Age: 73
End: 2018-08-27

## 2018-08-27 DIAGNOSIS — Z85.3 HISTORY OF LEFT BREAST CANCER: ICD-10-CM

## 2018-08-27 DIAGNOSIS — C50.411 MALIGNANT NEOPLASM OF UPPER-OUTER QUADRANT OF RIGHT FEMALE BREAST, UNSPECIFIED ESTROGEN RECEPTOR STATUS (HCC): Primary | ICD-10-CM

## 2018-08-27 NOTE — TELEPHONE ENCOUNTER
Called the patient and verified ID x 2. Inquired about the patient's lingering cough and verified that the patient had a chest x-ray this year 2018 which was negative. The patient stated that she did have a chest x-ray this year that was negative but the issue she called about seems to be more in her throat than in her chest because everytime she eats she gets \"choked on it\" and that it used to happen only when she ate something \"tangy\" like vinegar or ate a spicy dressing that would make her throat \"close up\" but now it seems that \"any particles of food\" cause her to \"cough and sneeze\" and that the left side of her throat is where it seems to be and that it is also in her ear too that makes it feel like something is in her left ear and that it is her throat more than her chest and that she starts coughing and then has drainage for \"20 minutes\" and that even as she is talking now her throat feels irritated but not sore like a sore throat and that she is unable to go out to eat because she \"goes into a coughing frenzy when she eats\" and it is \"embarrassing and annoying\" and that she saw her general practitioner three weeks ago and she glanced in her throat for thirty seconds and checked her chest and \"did not hear anything. \"  The patient also stated that she is a \"little hoarse\" right now and that this issue has been going on since spring and she stated that if this was an allergy it should have \"cleared up by now\" and that she takes an allergy medication that does seem to help until it \"wears off\" and that the allergy medication seems to slow the cough down and that she takes it at night and has \"no coughing. \"  Inquired if the patient is experiencing any other symptoms and the patient stated that she has had no fevers, she has had a couple headaches but they were \"mild\" and that she has had \"none this week\" and that she also has a pain in her side which she is going to see a doctor on 10/10/18 because when she went to see Dr. Kamron Vincent she ordered an [address] and did not \"find anything\" but she still has pain that radiates to her back so she sees Dr. Flash Suazo on 10/10/18 and hopes that it is not \"something that's growing\" but the patient stated that she does not believe that this pain has anything to do with her throat and that she is on a treatment for GERD which is Omeprozole that she takes at night and that she does have heartburn but does not when she takes the Omeprozole. Informed the patient that Dr. Marlene Bingham and Astrid Palacios NP will be made aware of the above information and that this office will call her back with an update. The patient verbalized understanding and denied any further questions or concerns.

## 2018-08-27 NOTE — TELEPHONE ENCOUNTER
Pt called and left a voicemail stating she still has a lingering cough that has gotten worse and was instructed by Dr. Chip Gustafson to call if it did not go away.  Call back number [phone]

## 2018-08-28 NOTE — TELEPHONE ENCOUNTER
Called the patient and verified ID x 2. Informed the patient that Dr. Marlene Bingham recommends a CT of the chest without contrast as he thinks this could possibly be a reaction that is occurring in her throat like esophagitis and that she should keep her appointment with GI in October and that they may do an EGD to determine the cause and that if it is esophagitis the usual treatment is an allergy medication but to see what GI recommends. Also, encouraged the patient to call the GI doctor's office and see if she can be placed on a cancellation list to possibly be seen sooner than October. The patient verbalized understanding and denied any further questions or concerns.

## 2018-08-31 ENCOUNTER — HOSPITAL ENCOUNTER (OUTPATIENT)
Dept: CT IMAGING | Age: 73
Discharge: HOME | End: 2018-08-31
Attending: INTERNAL MEDICINE
Payer: MEDICARE

## 2018-08-31 DIAGNOSIS — Z85.3 HISTORY OF LEFT BREAST CANCER: ICD-10-CM

## 2018-08-31 DIAGNOSIS — C50.411 MALIGNANT NEOPLASM OF UPPER-OUTER QUADRANT OF RIGHT FEMALE BREAST, UNSPECIFIED ESTROGEN RECEPTOR STATUS (HCC): ICD-10-CM

## 2018-08-31 PROCEDURE — 71250 CT THORAX DX C-: CPT

## 2018-09-05 ENCOUNTER — TELEPHONE (OUTPATIENT)
Dept: ONCOLOGY | Age: 73
End: 2018-09-05

## 2018-09-05 NOTE — PROGRESS NOTES
Spoke to patient and advised her of CT results. Patient voices understanding and denies any further questions at this time.

## 2018-12-03 DIAGNOSIS — C50.911 MALIGNANT NEOPLASM OF RIGHT FEMALE BREAST, UNSPECIFIED ESTROGEN RECEPTOR STATUS, UNSPECIFIED SITE OF BREAST (HCC): Primary | ICD-10-CM

## 2018-12-03 DIAGNOSIS — Z85.3 HISTORY OF LEFT BREAST CANCER: ICD-10-CM

## 2019-01-07 DIAGNOSIS — Z85.3 HISTORY OF RIGHT BREAST CANCER: Primary | ICD-10-CM

## 2019-01-14 ENCOUNTER — OFFICE VISIT (OUTPATIENT)
Dept: SURGERY | Age: 74
End: 2019-01-14

## 2019-01-14 ENCOUNTER — HOSPITAL ENCOUNTER (OUTPATIENT)
Dept: MAMMOGRAPHY | Age: 74
Discharge: HOME | End: 2019-01-14
Attending: SURGERY
Payer: MEDICARE

## 2019-01-14 VITALS
SYSTOLIC BLOOD PRESSURE: 132 MMHG | WEIGHT: 184 LBS | BODY MASS INDEX: 34.74 KG/M2 | HEART RATE: 56 BPM | DIASTOLIC BLOOD PRESSURE: 56 MMHG | HEIGHT: 61 IN

## 2019-01-14 DIAGNOSIS — Z85.3 HISTORY OF LEFT BREAST CANCER: ICD-10-CM

## 2019-01-14 DIAGNOSIS — C50.411 MALIGNANT NEOPLASM OF UPPER-OUTER QUADRANT OF RIGHT BREAST IN FEMALE, ESTROGEN RECEPTOR POSITIVE (HCC): Primary | ICD-10-CM

## 2019-01-14 DIAGNOSIS — Z85.3 HISTORY OF RIGHT BREAST CANCER: ICD-10-CM

## 2019-01-14 DIAGNOSIS — Z17.0 MALIGNANT NEOPLASM OF UPPER-OUTER QUADRANT OF RIGHT BREAST IN FEMALE, ESTROGEN RECEPTOR POSITIVE (HCC): Primary | ICD-10-CM

## 2019-01-14 PROCEDURE — 77066 DX MAMMO INCL CAD BI: CPT

## 2019-01-14 NOTE — PATIENT INSTRUCTIONS

## 2019-01-14 NOTE — PROGRESS NOTES
HISTORY OF PRESENT ILLNESS Alisia Monson is a 74 y.o. female. HPI ESTABLISHED patient here today for annual follow up RIGHT breast cancer. The patient is S/P RIGHT lumpectomy 2/2015 and completed radiation. She denies any palpable lumps,nipple inversion or discharge. The patient had her annual mammogram done today. 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 1992: LEFT lumpectomy and ALND for 'walnut sized tumor'. 0/32 LN positive. S/p radiation and chemotherapy (for possibly 6 months). Tamoxifen for 2 years (stopped by her due to not having evidence that more than 2 years was beneficial). 02/10/2015: RIGHT lumpectomy and SNBx of gr1 invasive ductal carcinoma with DCIS. 0/2 LN involved. ER+100%/KS+30% Her2-. Clear margins. 
  
03/24/2015 - /4/24/2015: S/p XRT 
04/2015 - 04/19/2016: Anastrozole (stopped due to joint pain) 05/01/2016 - 06/13/2016: Restarted anastrozole (stopped due to joint pain) 06/27/2016 - 07/18/206: Letrozole 08/01/2016 - : Tamoxifen ROS Physical Exam 
 
ASSESSMENT and PLAN 
{ASSESSMENT/PLAN:14789}

## 2019-01-14 NOTE — PROGRESS NOTES
HISTORY OF PRESENT ILLNESS  Kenzie Mcmillan is a 74 y.o. female. HPI  ESTABLISHED patient here today for annual follow up RIGHT breast cancer. The patient is S/P RIGHT lumpectomy 2/2015 and completed radiation. She denies any palpable lumps,nipple inversion or discharge. The patient had her annual mammogram done today. 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 1992: LEFT lumpectomy and ALND for 'walnut sized tumor'. 0/32 LN positive. S/p radiation and chemotherapy (for possibly 6 months). Tamoxifen for 2 years (stopped by her due to not having evidence that more than 2 years was beneficial). 02/10/2015: RIGHT lumpectomy and SNBx of gr1 invasive ductal carcinoma with DCIS. 0/2 LN involved. ER+100%/NH+30% Her2-.  Clear margins.     03/24/2015 - /4/24/2015: S/p XRT    04/2015 - 04/19/2016: Anastrozole (stopped due to joint pain)    05/01/2016 - 06/13/2016: Restarted anastrozole (stopped due to joint pain)    06/27/2016 - 07/18/206: Letrozole     08/01/2016 - : Tamoxifen    Past Medical History:   Diagnosis Date    Breast [facility] 2015    Right Breast    Breast CA ([facility] 1992    Left Breast    Cancer ([facility] 1992    LEFT breast  followed by chemo and Tamoxifen    GERD (gastroesophageal reflux disease)     Hypercholesterolemia     Hypertension        Past Surgical History:   Procedure Laterality Date    HX BREAST BIOPSY Left 1992    dcis    HX BREAST BIOPSY Right 2015    dcis    HX BREAST LUMPECTOMY Left 1992    LEFT    HX BREAST LUMPECTOMY Right 2015    HX HYSTERECTOMY      Took out part of both ovaries    HX OOPHORECTOMY Bilateral     partial    HX TONSILLECTOMY      age 25       Social History     Socioeconomic History    Marital status:      Spouse name: Not on file    Number of children: Not on file    Years of education: Not on file    Highest education level: Not on file Social Needs    Financial resource strain: Not on file    Food insecurity - worry: Not on file    Food insecurity - inability: Not on file   [employer] needs - medical: Not on file   [employer] needs - non-medical: Not on file   Occupational History    Not on file   Tobacco Use    Smoking status: Never Smoker    Smokeless tobacco: Never Used   Substance and Sexual Activity    Alcohol use: Yes     Alcohol/week: 0.5 oz     Types: 1 Cans of beer per week     Comment: beer    Drug use: No    Sexual activity: Not on file   Other Topics Concern    Not on file   Social History Narrative    Not on file       Current Outpatient Medications on File Prior to Visit   Medication Sig Dispense Refill    metoprolol succinate (TOPROL-XL) 100 mg tablet       escitalopram oxalate (LEXAPRO) 10 mg tablet       tamoxifen (NOLVADEX) 20 mg tablet TAKE ONE TABLET BY MOUTH ONCE DAILY 90 Tab 3    Omega-3 Fatty Acids 60- mg cpDR Take 1 Tab by mouth daily.  BIMATOPROST (LUMIGAN OP) Apply  to eye.  rosuvastatin (CRESTOR) 10 mg tablet Take 10 mg by mouth nightly.  vitamin e 400 unit tab Take 1,000 Units by mouth daily.  ascorbic acid (VITAMIN C) 500 mg tablet Take 1,000 mg by mouth daily.  naproxen sodium (ALEVE) 220 mg cap Take  by mouth.  GLUCOSAMINE HCL/CHONDR WEBSTER A NA (OSTEO BI-FLEX PO) Take 2 Tabs by mouth daily.  aspirin 81 mg chewable tablet Take 81 mg by mouth daily.  calcium-cholecalciferol, d3, (CALCIUM 600 + D) 600-125 mg-unit tab Take 2 Tabs by mouth Daily (before breakfast).  omeprazole (PRILOSEC) 10 mg capsule Take 10 mg by mouth daily.  diazepam (VALIUM) 5 mg tablet Take 1 Tab by mouth every six (6) hours as needed for Anxiety. 25 Tab 0    metoprolol succinate (TOPROL-XL) 25 mg XL tablet        No current facility-administered medications on file prior to visit.         Allergies   Allergen Reactions    Sulfa (Sulfonamide Antibiotics) Rash       OB History      Para Term  AB Living    3 3            SAB TAB Ectopic Molar Multiple Live Births    0                  Obstetric Comments    Menarche:  4. LMP: 28.  # of Children:  3. Age at Delivery of First Child:  11.   Hysterectomy/oophorectomy:  YES/NO. Breast Bx:  Yes. Hx of Breast Feeding:  No.  BCP:  No. Hormone therapy:  No.        ROS    Physical Exam   Cardiovascular: Normal rate and normal heart sounds. Pulmonary/Chest: Breath sounds normal. Right breast exhibits no inverted nipple, no mass, no nipple discharge, no skin change and no tenderness. Left breast exhibits no inverted nipple, no mass, no nipple discharge, no skin change and no tenderness. Breasts are symmetrical.       Lymphadenopathy:        Right cervical: No superficial cervical, no deep cervical and no posterior cervical adenopathy present. Left cervical: No superficial cervical, no deep cervical and no posterior cervical adenopathy present. Right axillary: No pectoral and no lateral adenopathy present. Left axillary: No pectoral and no lateral adenopathy present. ASSESSMENT and PLAN    ICD-10-CM ICD-9-CM    1. Malignant neoplasm of upper-outer quadrant of right breast in female, estrogen receptor positive (HCC) C50.411 174.4     Z17.0 V86.0    2. History of left breast cancer Z85.3 V10.3       Patient presents to f/u on RIGHT breast cancer and hx of LEFT breast cancer, and is doing well overall. Well healed incisions s/p BL lumpectomies, no evidence of local recurrence. F/U in 1 year. This plan was reviewed with the patient and patient agrees. All questions were answered.     Written by Kinsey Addison, as dictated by Dr. Fercho Kumar MD.

## 2019-01-17 NOTE — COMMUNICATION BODY
HISTORY OF PRESENT ILLNESS  Danita Sanabria is a 74 y.o. female. HPI  ESTABLISHED patient here today for annual follow up RIGHT breast cancer. The patient is S/P RIGHT lumpectomy 2/2015 and completed radiation. She denies any palpable lumps,nipple inversion or discharge. The patient had her annual mammogram done today. 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 1992: LEFT lumpectomy and ALND for 'walnut sized tumor'. 0/32 LN positive. S/p radiation and chemotherapy (for possibly 6 months). Tamoxifen for 2 years (stopped by her due to not having evidence that more than 2 years was beneficial). 02/10/2015: RIGHT lumpectomy and SNBx of gr1 invasive ductal carcinoma with DCIS. 0/2 LN involved. ER+100%/OH+30% Her2-.  Clear margins.     03/24/2015 - /4/24/2015: S/p XRT    04/2015 - 04/19/2016: Anastrozole (stopped due to joint pain)    05/01/2016 - 06/13/2016: Restarted anastrozole (stopped due to joint pain)    06/27/2016 - 07/18/206: Letrozole     08/01/2016 - : Tamoxifen    Past Medical History:   Diagnosis Date    Breast CA [facility] 2015    Right Breast    Breast CA ([facility] [address] [facility] 1992    Left Breast    Cancer ([facility] [address] 1992    LEFT breast  followed by chemo and Tamoxifen    GERD (gastroesophageal reflux disease)     Hypercholesterolemia     Hypertension        Past Surgical History:   Procedure Laterality Date    HX BREAST BIOPSY Left 1992    dcis    HX BREAST BIOPSY Right 2015    dcis    HX BREAST LUMPECTOMY Left 1992    LEFT    HX BREAST LUMPECTOMY Right 2015    HX HYSTERECTOMY      Took out part of both ovaries    HX OOPHORECTOMY Bilateral     partial    HX TONSILLECTOMY      age 25       Social History     Socioeconomic History    Marital status:      Spouse name: Not on file    Number of children: Not on file    Years of education: Not on file    Highest education level: Not on file Social Needs    Financial resource strain: Not on file    Food insecurity - worry: Not on file    Food insecurity - inability: Not on file   [place] needs - medical: Not on file   [place] needs - non-medical: Not on file   Occupational History    Not on file   Tobacco Use    Smoking status: Never Smoker    Smokeless tobacco: Never Used   Substance and Sexual Activity    Alcohol use: Yes     Alcohol/week: 0.5 oz     Types: 1 Cans of beer per week     Comment: beer    Drug use: No    Sexual activity: Not on file   Other Topics Concern    Not on file   Social History Narrative    Not on file       Current Outpatient Medications on File Prior to Visit   Medication Sig Dispense Refill    metoprolol succinate (TOPROL-XL) 100 mg tablet       escitalopram oxalate (LEXAPRO) 10 mg tablet       tamoxifen (NOLVADEX) 20 mg tablet TAKE ONE TABLET BY MOUTH ONCE DAILY 90 Tab 3    Omega-3 Fatty Acids 60- mg cpDR Take 1 Tab by mouth daily.  BIMATOPROST (LUMIGAN OP) Apply  to eye.  rosuvastatin (CRESTOR) 10 mg tablet Take 10 mg by mouth nightly.  vitamin e 400 unit tab Take 1,000 Units by mouth daily.  ascorbic acid (VITAMIN C) 500 mg tablet Take 1,000 mg by mouth daily.  naproxen sodium (ALEVE) 220 mg cap Take  by mouth.  GLUCOSAMINE HCL/CHONDR WEBSTER A NA (OSTEO BI-FLEX PO) Take 2 Tabs by mouth daily.  aspirin 81 mg chewable tablet Take 81 mg by mouth daily.  calcium-cholecalciferol, d3, (CALCIUM 600 + D) 600-125 mg-unit tab Take 2 Tabs by mouth Daily (before breakfast).  omeprazole (PRILOSEC) 10 mg capsule Take 10 mg by mouth daily.  diazepam (VALIUM) 5 mg tablet Take 1 Tab by mouth every six (6) hours as needed for Anxiety. 25 Tab 0    metoprolol succinate (TOPROL-XL) 25 mg XL tablet        No current facility-administered medications on file prior to visit.         Allergies   Allergen Reactions    Sulfa (Sulfonamide Antibiotics) Rash       OB History      Para Term  AB Living    3 3            SAB TAB Ectopic Molar Multiple Live Births    0                  Obstetric Comments    Menarche:  4. LMP: 28.  # of Children:  3. Age at Delivery of First Child:  11.   Hysterectomy/oophorectomy:  YES/NO. Breast Bx:  Yes. Hx of Breast Feeding:  No.  BCP:  No. Hormone therapy:  No.        ROS    Physical Exam   Cardiovascular: Normal rate and normal heart sounds. Pulmonary/Chest: Breath sounds normal. Right breast exhibits no inverted nipple, no mass, no nipple discharge, no skin change and no tenderness. Left breast exhibits no inverted nipple, no mass, no nipple discharge, no skin change and no tenderness. Breasts are symmetrical.       Lymphadenopathy:        Right cervical: No superficial cervical, no deep cervical and no posterior cervical adenopathy present. Left cervical: No superficial cervical, no deep cervical and no posterior cervical adenopathy present. Right axillary: No pectoral and no lateral adenopathy present. Left axillary: No pectoral and no lateral adenopathy present. ASSESSMENT and PLAN    ICD-10-CM ICD-9-CM    1. Malignant neoplasm of upper-outer quadrant of right breast in female, estrogen receptor positive (HCC) C50.411 174.4     Z17.0 V86.0    2. History of left breast cancer Z85.3 V10.3       Patient presents to f/u on RIGHT breast cancer and hx of LEFT breast cancer, and is doing well overall. Well healed incisions s/p BL lumpectomies, no evidence of local recurrence. F/U in 1 year. This plan was reviewed with the patient and patient agrees. All questions were answered.     Written by Maxime Russ, as dictated by Dr. Reji Hook MD.

## 2019-03-12 ENCOUNTER — OFFICE VISIT (OUTPATIENT)
Dept: ONCOLOGY | Age: 74
End: 2019-03-12

## 2019-03-12 VITALS
WEIGHT: 186.4 LBS | BODY MASS INDEX: 35.19 KG/M2 | HEART RATE: 53 BPM | RESPIRATION RATE: 20 BRPM | DIASTOLIC BLOOD PRESSURE: 68 MMHG | TEMPERATURE: 97.6 F | HEIGHT: 61 IN | SYSTOLIC BLOOD PRESSURE: 147 MMHG | OXYGEN SATURATION: 95 %

## 2019-03-12 DIAGNOSIS — C50.411 MALIGNANT NEOPLASM OF UPPER-OUTER QUADRANT OF RIGHT FEMALE BREAST, UNSPECIFIED ESTROGEN RECEPTOR STATUS (HCC): Primary | ICD-10-CM

## 2019-03-12 RX ORDER — DICYCLOMINE HYDROCHLORIDE 10 MG/1
10 CAPSULE ORAL AS NEEDED
COMMUNITY
Start: 2019-01-29

## 2019-03-12 NOTE — PROGRESS NOTES
[employer]  [address] [place] [address]., [address] [facility]  [address]  W: [phone]  F: [phone]     f/u HEME/ONC CONSULT    Reason for visit: evaluation for treatment for   Breast Cancer    Consulting physician:  Dr. Teena Montes De Oca    HPI:   Durene Homans is a 74 y.o.  female who I was asked to see in consultation at the request of Dr. Sharon Lazo for evaluation for systemic therapy for breast cancer. She had left sided breast cancer in 1992, path report not available to me, treated with lumpectomy for a \"walnut sized tumor,\" 0/32 LN involved, followed by radiation and chemotherapy (for possibly 6 months, so maybe CMF?) and then tamoxifen for 2 years (stopped by her due to not having evidence that more than 2 years was beneficial). She had an abnormal mammogram leading to a RIGHT breast biopsy on 1/19/2015, 0.7 cm of invasive carcinoma with mucinous features, gr 1, with DCIS, ER + at 100% and AR + at 30% for both invasive and DCIS component, HER 2 negative at IHC 0, ki67 18%. Right lumpectomy on 2/10/15 shows invasive ductal carcinoma, gr 1, no LVI, 0/2 LN involved, solid and cribriform DCIS focally present, negative but close DCIS margin. S/p xrt 3/24/15-4/14/15  Started anastrozole 4/2015-4/19/16 stopped due to joint pain  Restarted anastrozole 5/1/16-6/13/16 stopped due to joint pain  Letrozole 6/27/16-7/18/16  Tamoxifen 8/1/16-    Interval history: In today for follow up. Today complains of: gr 1 fatigue, anxiety, 4/10 occasional right side pain, gr 1 cough, gr 2 sob, gr 1 neuropathy, gr 3 vaginal dryness    Had cataract surgery in Oct 2018, now blind in R eye due to a complication      DX   Encounter Diagnosis   Name Primary?     Malignant neoplasm of upper-outer quadrant of right female breast, unspecified estrogen receptor status ([address] Yes        Past Medical History:   Diagnosis Date    Breast CA ([address] 2015    Right Breast    Breast CA ([address] 1992    Left Breast    [facility]    LEFT breast  followed by chemo and Tamoxifen    GERD (gastroesophageal reflux disease)     Hypercholesterolemia     Hypertension      Past Surgical History:   Procedure Laterality Date    HX BREAST BIOPSY Left 1992    dcis    HX BREAST BIOPSY Right 2015    dcis    HX BREAST LUMPECTOMY Left 1992    LEFT    HX BREAST LUMPECTOMY Right 2015    HX CATARACT REMOVAL Right 10/2018    HX HYSTERECTOMY      Took out part of both ovaries    HX OOPHORECTOMY Bilateral     partial    HX TONSILLECTOMY      age 25     Social History     Socioeconomic History    Marital status:      Spouse name: Not on file    Number of children: Not on file    Years of education: Not on file    Highest education level: Not on file   Tobacco Use    Smoking status: Never Smoker    Smokeless tobacco: Never Used   Substance and Sexual Activity    Alcohol use: Yes     Alcohol/week: 0.5 oz     Types: 1 Cans of beer per week     Comment: beer    Drug use: No     Family History   Problem Relation Age of Onset    Cancer Mother         Lung CA, was a smoker    Heart Disease Father        Current Outpatient Medications   Medication Sig Dispense Refill    dicyclomine (BENTYL) 10 mg capsule       metoprolol succinate (TOPROL-XL) 100 mg tablet Take 100 mg by mouth daily.  metoprolol succinate (TOPROL-XL) 25 mg XL tablet Take 25 mg by mouth nightly.  escitalopram oxalate (LEXAPRO) 10 mg tablet Take 10 mg by mouth daily.  tamoxifen (NOLVADEX) 20 mg tablet TAKE ONE TABLET BY MOUTH ONCE DAILY 90 Tab 3    BIMATOPROST (LUMIGAN OP) Apply  to eye.  rosuvastatin (CRESTOR) 10 mg tablet Take 10 mg by mouth nightly.  aspirin 81 mg chewable tablet Take 81 mg by mouth daily.  omeprazole (PRILOSEC) 10 mg capsule Take 10 mg by mouth daily.  naproxen sodium (ALEVE) 220 mg cap Take  by mouth.  diazepam (VALIUM) 5 mg tablet Take 1 Tab by mouth every six (6) hours as needed for Anxiety.  25 Tab 0 Allergies   Allergen Reactions    Sulfa (Sulfonamide Antibiotics) Rash       Review of Systems    A comprehensive review of systems was performed and all systems were negative except for HPI and for the symptom report form, reviewed and scanned in.    Objective:  Physical Exam:  Visit Vitals  /68   Pulse (!) 53   Temp 97.6 °F (36.4 °C) (Temporal)   Resp 20   Ht 5' 1\" (1.549 m)   Wt 186 lb 6.4 oz (84.6 kg)   SpO2 95%   BMI 35.22 kg/m²       General:  Alert, cooperative, no distress, appears stated age. Head:  Normocephalic, without obvious abnormality, atraumatic. Eyes:  Conjunctivae/corneas clear. PERRL, EOMs intact. Throat: Lips, mucosa, and tongue normal.    Neck: Supple, symmetrical, trachea midline, no adenopathy, thyroid: no enlargement/tenderness/nodules   Back:   Symmetric, no curvature. ROM normal. No CVA tenderness. Lungs:   Clear to auscultation bilaterally. Chest wall:  No tenderness or deformity. Heart:  Regular rate and rhythm, S1, S2 normal, no murmur, click, rub or gallop. Abdomen:   Soft, non-tender. Bowel sounds normal. No masses,  No organomegaly. Extremities: Extremities normal, atraumatic, no cyanosis. Left arm lymphedema. Skin: Skin color, texture, turgor normal. No rashes or lesions. Lymph nodes: Cervical, supraclavicular, and axillary nodes normal.   Neurologic: CNII-XII intact. Diagnostic Imaging     Results for orders placed during the hospital encounter of 08/31/18   CT CHEST WO CONT    Narrative INDICATION: Pt with hx of breast cancer and ongoing throat pain    COMPARISON: None    CONTRAST: None. TECHNIQUE:  5 mm axial images were obtained through the chest. Coronal and  sagittal reconstructions were generated. CT dose reduction was achieved through  use of a standardized protocol tailored for this examination and automatic  exposure control for dose modulation. Adaptive statistical iterative  reconstruction (ASIR) was utilized.     The absence of intravenous contrast reduces the sensitivity for evaluation of  the mediastinum and upper abdominal organs. FINDINGS:    THYROID: No nodule. MEDIASTINUM: No mass or lymphadenopathy. JONATAN: No mass or lymphadenopathy. THORACIC AORTA: No aneurysm. MAIN PULMONARY ARTERY: Normal in caliber. TRACHEA/BRONCHI: Patent. ESOPHAGUS: No wall thickening or dilatation. HEART: There is mild cardiomegaly. PLEURA: No effusion or pneumothorax. LUNGS: No nodule, mass, or airspace disease. INCIDENTALLY IMAGED UPPER ABDOMEN: No focal abnormality. BONES: No destructive bone lesion. There is an oval mixed density lesion in the central right breast, presumably a  postoperative seroma, measuring 3.2 x 3.4 cm. There are postsurgical clips in  the left axilla and within the left breast.  The thoracic esophagus is not distended. Impression IMPRESSION:    1. No evidence of thoracic metastatic disease or acute process. 2. Postsurgical changes within both breasts. 3. Mild cardiomegaly. 3/10/15 dexa  REPORT:  Bone Mineral Density  Indication: screening for osteoporosis  Age: 68  Sex: Female. Menopause status: postmenopausal.  Hormone replacement therapy: No   Risk factors for fall: frequent falls   Risk factors for osteoporosis: AI  Current medication for osteoporosis: Calcium and vitamin D.   Comparison: 2002  Technique: Imaging was performed on the Azullo meta-analysis fracture risk calculator (FRAX) analysis   was performed for 10 year fracture risk probability assessment  Excluded sites: 0  Findings:  Fractures identified on Lateral scanogram: 0  Lumbar spine: L1-4  Bone mineral density (gm/cm2): 1.250  % of peak bone mass: 105  % for age matched controls: 80  T score: 0.4  Z score: 1.5  Hip: Right femoral neck  Bone mineral density (gm/cm2): 0.993  % of peak bone mass: 96  % for age matched controls: 117  T score: -0.3  Z score: 1  IMPRESSION:  This patient is normal using the World Health Organization criteria  As compared to the prior study, there has been a significant 5.3% increase   in the left total hip, a significant 19.2% increase in the lumbar spine  10 year probability of major osteoporotic fracture: 7.2%  10 year probability of hip fracture: 0.5%    10/19/15 bilateral mammogram  FINDINGS: Bilateral digital diagnostic mammography was performed, and is   interpreted in conjunction with a computer assisted detection (CAD) system. Within the upper outer quadrant of the right breast, there is a large   rounded mass, possibly representing a postoperative seroma. The left breast   postoperative changes are stable. No suspicious calcifications are   identified. There is no skin thickening or nipple retraction. Targeted evaluation of the upper-outer quadrant of the right breast is   performed. Within the 10:00 position of the right breast, there is a 4.8 CM   by 3.4 CM by 5.9 CM low circumscribed complex avascular collection, likely   representing a seroma. IMPRESSION: BI-RADS 3. Probable benign finding. Recommend right breast 6   month short interval followup, unless earlier clinical indication. Findings   were discussed with the patient at the time of interpretation. 4/18/16 R mammo   IMPRESSION:  1. BI-RADS category 3, probably benign. 2. The patient should return in 6 months for bilateral mammography. 3. She was informed. 10/31/16 [place] mammo: negative, f/u in 1 year.      3/13/17 dexa  Lumbar spine: L1-L4  Bone mineral density (gm/cm2): 1.183  % of peak bone mass: 99  % for age matched controls: 80  T score: -0.1  Z score: 1.1      Total hip: Left  Bone mineral density (gm/cm2): 1.064  % of peak bone mass: 106  % for age matched controls: 80  T score: 0.4  Z score: 1.7     Femoral neck: Left  Bone mineral density (gm/cm2): 0.961  % of peak bone mass: 93  % for age matched controls: 80  T score: -0.6  Z score: 0.9     Forearm: Left  Bone mineral density (gm/cm2): 0. 789  % of peak bone mass: 90  % for age matched controls: 80  T score: -1.0  Z score: 1.1                  IMPRESSION  Impression:      This patient is normal using the World Health Organization criteria    1/7/19 mammogram   Negative    8/31/18 ct chest wo contrast  negative    Lab Results  No results found for: WBC    No results found for: NA    . Assessment/Plan:  74 y.o. female with [id number] right breast cancer, ER +, MT +, HER 2 negative, gr 1, 0/2 LN involved, 0.7 cm. PS 0    1. Right Breast cancer stage: IA    Hormonal therapy: administered     With no further therapy, her RR is 14%. Treatment with endocrine therapy will decrease her RR to 7%. Chemotherapy would only add an 1% benefit. No evidence of recurrence, continue tamoxifen    Bilateral mammogram due in jan 2020    Last eye exam: 10/2018  Last gyn exam: s/p hysterectomy    Will see her back in 6 months for follow up. 2. Hypertension: stable. On metroprolol    3. Emotional well being: She is coping well with her disease and has great support. 4. Lung nodule:CT scan 8/2018 negative    5. Back pain, lower, chronic:stable, may be slightly improved, using aleve with relief. Pt not interested in seeing orthopedics or getting scan at this time. She will call us if this worsens. Will monitor. 6. Lymphedema: in left arm. Has been seen in  lymphedema     7. HM:  Saw Dr. Tavia Burnett, had a cologuard negative from PCP    Thank you for this consult. All of the patient's questions were answered today. There are no Patient Instructions on file for this visit. Follow-up Disposition:  Return in about 1 year (around 3/12/2020).     Roscoe Perea MD

## 2019-03-26 RX ORDER — TAMOXIFEN CITRATE 20 MG/1
TABLET ORAL
Qty: 90 TAB | Refills: 3 | Status: SHIPPED | OUTPATIENT
Start: 2019-03-26

## 2019-10-30 ENCOUNTER — OFFICE VISIT (OUTPATIENT)
Dept: ONCOLOGY | Age: 74
End: 2019-10-30

## 2019-10-30 VITALS
RESPIRATION RATE: 20 BRPM | SYSTOLIC BLOOD PRESSURE: 172 MMHG | WEIGHT: 185 LBS | OXYGEN SATURATION: 93 % | DIASTOLIC BLOOD PRESSURE: 66 MMHG | HEART RATE: 55 BPM | HEIGHT: 61 IN | TEMPERATURE: 97.5 F | BODY MASS INDEX: 34.93 KG/M2

## 2019-10-30 DIAGNOSIS — I10 BENIGN ESSENTIAL HTN: ICD-10-CM

## 2019-10-30 DIAGNOSIS — R91.1 LUNG NODULE: ICD-10-CM

## 2019-10-30 DIAGNOSIS — G89.29 CHRONIC MIDLINE LOW BACK PAIN WITHOUT SCIATICA: ICD-10-CM

## 2019-10-30 DIAGNOSIS — C50.411 MALIGNANT NEOPLASM OF UPPER-OUTER QUADRANT OF RIGHT FEMALE BREAST, UNSPECIFIED ESTROGEN RECEPTOR STATUS (HCC): Primary | ICD-10-CM

## 2019-10-30 DIAGNOSIS — Z85.3 HISTORY OF LEFT BREAST CANCER: ICD-10-CM

## 2019-10-30 DIAGNOSIS — I89.0 LYMPHEDEMA: ICD-10-CM

## 2019-10-30 DIAGNOSIS — M54.50 CHRONIC MIDLINE LOW BACK PAIN WITHOUT SCIATICA: ICD-10-CM

## 2019-10-30 RX ORDER — SUCRALFATE 1 G/10ML
1 SUSPENSION ORAL 4 TIMES DAILY
Qty: 414 ML | Refills: 0 | Status: SHIPPED | OUTPATIENT
Start: 2019-10-30 | End: 2019-10-31

## 2019-10-30 NOTE — PROGRESS NOTES
[address], [address] [facility] [place]  W: [phone]  F: [phone]     f/u HEME/ONC CONSULT    Reason for visit: evaluation for treatment for   Breast Cancer    Consulting physician:  Dr. Lissett Grijalva    HPI:   Ashley Conroy is a 66 y.o.  female who I was asked to see in consultation at the request of Dr. Laron Perera for evaluation for systemic therapy for breast cancer. She had left sided breast cancer in 1992, path report not available to me, treated with lumpectomy for a \"walnut sized tumor,\" 0/32 LN involved, followed by radiation and chemotherapy (for possibly 6 months, so maybe CMF?) and then tamoxifen for 2 years (stopped by her due to not having evidence that more than 2 years was beneficial). She had an abnormal mammogram leading to a RIGHT breast biopsy on 1/19/2015, 0.7 cm of invasive carcinoma with mucinous features, gr 1, with DCIS, ER + at 100% and VT + at 30% for both invasive and DCIS component, HER 2 negative at IHC 0, ki67 18%. Right lumpectomy on 2/10/15 shows invasive ductal carcinoma, gr 1, no LVI, 0/2 LN involved, solid and cribriform DCIS focally present, negative but close DCIS margin. S/p xrt 3/24/15-4/14/15  Started anastrozole 4/2015-4/19/16 stopped due to joint pain  Restarted anastrozole 5/1/16-6/13/16 stopped due to joint pain  Letrozole 6/27/16-7/18/16  Tamoxifen 8/1/16-    Interval history: In today for follow up. Complains of 1/10 pain to right breast, gr 1 cough, gr 1 sob, gr 1 neuropathy, gr 1 headache, gr 1 urinary leakage, gr 1 libido, gr 1 vaginal dryness. 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     Malignant neoplasm of upper-outer quadrant of right female breast, unspecified estrogen receptor status ([facility] [address] Yes    History of left breast cancer     Lymphedema     Lung nodule     Benign essential HTN     Chronic midline low back pain without sciatica         Past Medical History:   Diagnosis Date    Breast CA ([facility] [address] 2015    Right Breast    Breast CA ([facility] 1992    Left Breast    Cancer ([facility] 1992    LEFT breast  followed by chemo and Tamoxifen    GERD (gastroesophageal reflux disease)     Hypercholesterolemia     Hypertension      Past Surgical History:   Procedure Laterality Date    HX BREAST BIOPSY Left 1992    dcis    HX BREAST BIOPSY Right 2015    dcis    HX BREAST LUMPECTOMY Left 1992    LEFT    HX BREAST LUMPECTOMY Right 2015    HX CATARACT REMOVAL Right 10/2018    HX HYSTERECTOMY      Took out part of both ovaries    HX OOPHORECTOMY Bilateral     partial    HX TONSILLECTOMY      age 25     Social History     Socioeconomic History    Marital status:      Spouse name: Not on file    Number of children: Not on file    Years of education: Not on file    Highest education level: Not on file   Tobacco Use    Smoking status: Never Smoker    Smokeless tobacco: Never Used   Substance and Sexual Activity    Alcohol use: Yes     Alcohol/week: 0.8 standard drinks     Types: 1 Cans of beer per week     Comment: beer    Drug use: No     Family History   Problem Relation Age of Onset    Cancer Mother         Lung CA, was a smoker    Heart Disease Father        Current Outpatient Medications   Medication Sig Dispense Refill    tamoxifen (NOLVADEX) 20 mg tablet TAKE 1 TABLET BY MOUTH ONCE DAILY 90 Tab 3    metoprolol succinate (TOPROL-XL) 100 mg tablet Take 100 mg by mouth daily.  metoprolol succinate (TOPROL-XL) 25 mg XL tablet Take 25 mg by mouth nightly.  escitalopram oxalate (LEXAPRO) 10 mg tablet Take 10 mg by mouth daily.  rosuvastatin (CRESTOR) 10 mg tablet Take 10 mg by mouth nightly.  aspirin 81 mg chewable tablet Take 81 mg by mouth daily.  omeprazole (PRILOSEC) 10 mg capsule Take 10 mg by mouth daily.       dicyclomine (BENTYL) 10 mg capsule Take 10 mg by mouth as needed.  naproxen sodium (ALEVE) 220 mg cap Take  by mouth.  diazepam (VALIUM) 5 mg tablet Take 1 Tab by mouth every six (6) hours as needed for Anxiety. 25 Tab 0       Allergies   Allergen Reactions    Sulfa (Sulfonamide Antibiotics) Rash       Review of Systems    A comprehensive review of systems was performed and all systems were negative except for HPI and for the symptom report form, reviewed and scanned in.    Objective:  Physical Exam:  Visit Vitals  /66   Pulse (!) 55   Temp 97.5 °F (36.4 °C) (Temporal)   Resp 20   Ht 5' 1\" (1.549 m)   Wt 185 lb (83.9 kg)   SpO2 93%   BMI 34.96 kg/m²       General:  Alert, cooperative, no distress, appears stated age. Head:  Normocephalic, without obvious abnormality, atraumatic. Eyes:  Conjunctivae/corneas clear. PERRL, EOMs intact. Throat: Lips, mucosa, and tongue normal.    Neck: Supple, symmetrical, trachea midline, no adenopathy, thyroid: no enlargement/tenderness/nodules   Back:   Symmetric, no curvature. ROM normal. No CVA tenderness. Lungs:   Clear to auscultation bilaterally. Chest wall:  No tenderness or deformity. Heart:  Regular rate and rhythm, S1, S2 normal, no murmur, click, rub or gallop. Abdomen:   Soft, non-tender. Bowel sounds normal. No masses,  No organomegaly. Extremities: Extremities normal, atraumatic, no cyanosis. Left arm lymphedema. Skin: Skin color, texture, turgor normal. No rashes or lesions. Lymph nodes: Cervical, supraclavicular, and axillary nodes normal.   Neurologic: CNII-XII intact. Breasts: Healed lumpectomy scars to bilateral breasts, no pain/tenderness. Diagnostic Imaging     Results for orders placed during the hospital encounter of 08/31/18   CT CHEST WO CONT    Narrative INDICATION: Pt with hx of breast cancer and ongoing throat pain    COMPARISON: None    CONTRAST: None.     TECHNIQUE:  5 mm axial images were obtained through the chest. Coronal and  sagittal reconstructions were generated. CT dose reduction was achieved through  use of a standardized protocol tailored for this examination and automatic  exposure control for dose modulation. Adaptive statistical iterative  reconstruction (ASIR) was utilized. The absence of intravenous contrast reduces the sensitivity for evaluation of  the mediastinum and upper abdominal organs. FINDINGS:    THYROID: No nodule. MEDIASTINUM: No mass or lymphadenopathy. JONATAN: No mass or lymphadenopathy. THORACIC AORTA: No aneurysm. MAIN PULMONARY ARTERY: Normal in caliber. TRACHEA/BRONCHI: Patent. ESOPHAGUS: No wall thickening or dilatation. HEART: There is mild cardiomegaly. PLEURA: No effusion or pneumothorax. LUNGS: No nodule, mass, or airspace disease. INCIDENTALLY IMAGED UPPER ABDOMEN: No focal abnormality. BONES: No destructive bone lesion. There is an oval mixed density lesion in the central right breast, presumably a  postoperative seroma, measuring 3.2 x 3.4 cm. There are postsurgical clips in  the left axilla and within the left breast.  The thoracic esophagus is not distended. Impression IMPRESSION:    1. No evidence of thoracic metastatic disease or acute process. 2. Postsurgical changes within both breasts. 3. Mild cardiomegaly. 3/10/15 dexa  REPORT:  Bone Mineral Density  Indication: screening for osteoporosis  Age: 68  Sex: Female. Menopause status: postmenopausal.  Hormone replacement therapy: No   Risk factors for fall: frequent falls   Risk factors for osteoporosis: AI  Current medication for osteoporosis: Calcium and vitamin D.   Comparison: 2002  Technique: Imaging was performed on the miLibris meta-analysis fracture risk calculator (FRAX) analysis   was performed for 10 year fracture risk probability assessment  Excluded sites: 0  Findings:  Fractures identified on Lateral scanogram: 0  Lumbar spine: L1-4  Bone mineral density (gm/cm2): 1.250  % of peak bone mass: 105  % for age matched controls: 80  T score: 0.4  Z score: 1.5  Hip: Right femoral neck  Bone mineral density (gm/cm2): 0.993  % of peak bone mass: 96  % for age matched controls: 80  T score: -0.3  Z score: 1  IMPRESSION:  This patient is normal using the World Health Organization criteria  As compared to the prior study, there has been a significant 5.3% increase   in the left total hip, a significant 19.2% increase in the lumbar spine  10 year probability of major osteoporotic fracture: 7.2%  10 year probability of hip fracture: 0.5%    10/19/15 bilateral mammogram  FINDINGS: Bilateral digital diagnostic mammography was performed, and is   interpreted in conjunction with a computer assisted detection (CAD) system. Within the upper outer quadrant of the right breast, there is a large   rounded mass, possibly representing a postoperative seroma. The left breast   postoperative changes are stable. No suspicious calcifications are   identified. There is no skin thickening or nipple retraction. Targeted evaluation of the upper-outer quadrant of the right breast is   performed. Within the 10:00 position of the right breast, there is a 4.8 CM   by 3.4 CM by 5.9 CM low circumscribed complex avascular collection, likely   representing a seroma. IMPRESSION: BI-RADS 3. Probable benign finding. Recommend right breast 6   month short interval followup, unless earlier clinical indication. Findings   were discussed with the patient at the time of interpretation. 4/18/16 R mammo   IMPRESSION:  1. BI-RADS category 3, probably benign. 2. The patient should return in 6 months for bilateral mammography. 3. She was informed. 10/31/16 [place] mamm[facility]: negative, f/u in 1 year.      3/13/17 dexa  Lumbar spine: L1-L4  Bone mineral density (gm/cm2): 1.183  % of peak bone mass: 99  % for age matched controls: 80  T score: -0.1  Z score: 1.1      Total hip: Left  Bone mineral density (gm/cm2): 1.064  % of peak bone mass: 106  % for age matched controls: 80  T score: 0.4  Z score: 1.7     Femoral neck: Left  Bone mineral density (gm/cm2): 0.961  % of peak bone mass: 93  % for age matched controls: 80  T score: -0.6  Z score: 0.9     Forearm: Left  Bone mineral density (gm/cm2): 0.789  % of peak bone mass: 90  % for age matched controls: 80  T score: -1.0  Z score: 1.1     Impression:      This patient is normal using the World Health Organization criteria    1/7/19 mammogram   Negative    8/31/18 ct chest wo contrast  negative    Lab Results  No results found for: WBC    No results found for: NA    . Assessment/Plan:  66 y.o. female with [id number] right breast cancer, ER +, [place] +, HER 2 negative, gr 1, 0/2 LN involved, 0.7 cm. PS 0    1. Right Breast cancer stage: IA    Hormonal therapy: administered     With no further therapy, her RR is 14%. Treatment with endocrine therapy will decrease her RR to 7%. Chemotherapy would only add an 1% benefit. No evidence of recurrence, continue tamoxifen    Bilateral mammogram scheduled for 1/20/20 with follow up with Dr. Nimco Marquez. Last eye exam: 4/2019  Last gyn exam: s/p hysterectomy    2. Hypertension: Elevated today. On metroprolol. Managed by PCP and cardiologist. Recommend keeping log of BPs at home for 1 week. If continually high, recommend follow up with PCP within the next few weeks. 3. Emotional well being: She is coping well with her disease and has great support. 4. Lung nodule: CT scan 8/2018 negative    5. Back pain, lower, chronic, but this has improved. 6. Lymphedema: in left arm. Has been seen in  lymphedema     7. HM:  Saw Dr. Hima Singh, had a cologuard negative from PCP    8. Chest discomfort:  Reports it is a shooting pain that radiates out through her chest and into her back. Reports this is short lived and resolves on its own. She believes this may be related to gas/acid reflux, notices this more depending on her diet. Currently on Bentyl and Prilosec. Recommend follow up with Dr. Hamida Johns; will rx carafate    Thank you for this consult. All of the patient's questions were answered today. There are no Patient Instructions on file for this visit. Follow-up and Dispositions    · Return in about 6 months (around 4/30/2020) for 6 mo fu, Melissa Cisneros.          Jael Dudley MD

## 2019-10-31 RX ORDER — SUCRALFATE 1 G/1
1 TABLET ORAL 4 TIMES DAILY
Qty: 120 TAB | Refills: 0 | Status: SHIPPED | OUTPATIENT
Start: 2019-10-31 | End: 2020-01-20 | Stop reason: ALTCHOICE

## 2019-12-14 ENCOUNTER — APPOINTMENT (OUTPATIENT)
Dept: GENERAL RADIOLOGY | Age: 74
End: 2019-12-14
Attending: EMERGENCY MEDICINE
Payer: MEDICARE

## 2019-12-14 ENCOUNTER — HOSPITAL ENCOUNTER (EMERGENCY)
Age: 74
Discharge: HOME | End: 2019-12-14
Attending: EMERGENCY MEDICINE
Payer: MEDICARE

## 2019-12-14 VITALS
HEART RATE: 60 BPM | TEMPERATURE: 97.6 F | SYSTOLIC BLOOD PRESSURE: 127 MMHG | RESPIRATION RATE: 16 BRPM | HEIGHT: 61 IN | WEIGHT: 183 LBS | BODY MASS INDEX: 34.55 KG/M2 | OXYGEN SATURATION: 97 % | DIASTOLIC BLOOD PRESSURE: 106 MMHG

## 2019-12-14 DIAGNOSIS — S22.42XA CLOSED FRACTURE OF MULTIPLE RIBS OF LEFT SIDE, INITIAL ENCOUNTER: Primary | ICD-10-CM

## 2019-12-14 PROCEDURE — 71101 X-RAY EXAM UNILAT RIBS/CHEST: CPT

## 2019-12-14 PROCEDURE — 99284 EMERGENCY DEPT VISIT MOD MDM: CPT

## 2019-12-14 PROCEDURE — 77030027138 HC INCENT SPIROMETER -A

## 2019-12-14 PROCEDURE — 74011250637 HC RX REV CODE- 250/637: Performed by: EMERGENCY MEDICINE

## 2019-12-14 RX ORDER — ACETAMINOPHEN 500 MG
1000 TABLET ORAL EVERY 6 HOURS
Qty: 80 TAB | Refills: 0 | Status: SHIPPED | OUTPATIENT
Start: 2019-12-14 | End: 2019-12-24

## 2019-12-14 RX ORDER — ONDANSETRON 4 MG/1
4 TABLET, ORALLY DISINTEGRATING ORAL
Status: COMPLETED | OUTPATIENT
Start: 2019-12-14 | End: 2019-12-14

## 2019-12-14 RX ORDER — ACETAMINOPHEN 500 MG
1000 TABLET ORAL
Status: COMPLETED | OUTPATIENT
Start: 2019-12-14 | End: 2019-12-14

## 2019-12-14 RX ORDER — OXYCODONE HYDROCHLORIDE 5 MG/1
5 TABLET ORAL
Status: COMPLETED | OUTPATIENT
Start: 2019-12-14 | End: 2019-12-14

## 2019-12-14 RX ORDER — NAPROXEN 500 MG/1
500 TABLET ORAL 2 TIMES DAILY WITH MEALS
Qty: 14 TAB | Refills: 0 | Status: SHIPPED | OUTPATIENT
Start: 2019-12-14 | End: 2019-12-21

## 2019-12-14 RX ORDER — OXYCODONE HYDROCHLORIDE 5 MG/1
5 TABLET ORAL
Qty: 20 TAB | Refills: 0 | Status: SHIPPED | OUTPATIENT
Start: 2019-12-14 | End: 2019-12-21

## 2019-12-14 RX ADMIN — ACETAMINOPHEN 1000 MG: 500 TABLET ORAL at 02:28

## 2019-12-14 RX ADMIN — ONDANSETRON 4 MG: 4 TABLET, ORALLY DISINTEGRATING ORAL at 02:28

## 2019-12-14 RX ADMIN — OXYCODONE HYDROCHLORIDE 5 MG: 5 TABLET ORAL at 02:29

## 2019-12-14 NOTE — ED PROVIDER NOTES
The history is provided by the patient. Fall   The accident occurred 1 to 2 hours ago. The fall occurred while walking. She fell from a height of 3 - 5 ft. She landed on hard floor. There was no blood loss. Point of impact: left back and chest. The pain is severe. She was ambulatory at the scene. There was no entrapment after the fall. There was no drug use involved in the accident. There was no alcohol use involved in the accident. Pertinent negatives include no fever, no abdominal pain, no vomiting, no extremity weakness and no loss of consciousness. The risk factors include being elderly. Exacerbated by: deep breathing, coughing, movement, palpation. She has tried NSAIDs for the symptoms. The treatment provided mild relief. Rib Pain   This is a new problem. The problem has not changed since onset. Associated symptoms include chest pain (on left side posteriorly). Pertinent negatives include no fever, no sputum production, no hemoptysis, no vomiting and no abdominal pain. Associated medical issues do not include COPD or chronic lung disease.         Past Medical History:   Diagnosis Date    Breast CA [facility]) 2015    Right Breast    [facility]    [facility]    [facility] ([facility]    LEFT breast  followed by chemo and Tamoxifen    GERD (gastroesophageal reflux disease)     Hypercholesterolemia     Hypertension        Past Surgical History:   Procedure Laterality Date    HX BREAST BIOPSY Left 1992    dcis    HX BREAST BIOPSY Right 2015    dcis    HX BREAST LUMPECTOMY Left 1992    LEFT    HX BREAST LUMPECTOMY Right 2015    HX CATARACT REMOVAL Right 10/2018    HX HYSTERECTOMY      Took out part of both ovaries    HX OOPHORECTOMY Bilateral     partial    HX TONSILLECTOMY      age 25         Family History:   Problem Relation Age of Onset    Cancer Mother         Lung CA, was a smoker    Heart Disease Father        Social History     Socioeconomic History    Marital status:      Spouse name: Not on file    Number of children: Not on file    Years of education: Not on file    Highest education level: Not on file   Occupational History    Not on file   Social Needs    Financial resource strain: Not on file    Food insecurity:     Worry: Not on file     Inability: Not on file    Transportation needs:     Medical: Not on file     Non-medical: Not on file   Tobacco Use    Smoking status: Never Smoker    Smokeless tobacco: Never Used   Substance and Sexual Activity    Alcohol use: Yes     Alcohol/week: 0.8 standard drinks     Types: 1 Cans of beer per week     Comment: beer    Drug use: No    Sexual activity: Not on file   Lifestyle    Physical activity:     Days per week: Not on file     Minutes per session: Not on file    Stress: Not on file   Relationships    Social connections:     Talks on phone: Not on file     Gets together: Not on file     Attends Congregation service: Not on file     Active member of club or organization: Not on file     Attends meetings of clubs or organizations: Not on file     Relationship status: Not on file    Intimate partner violence:     Fear of current or ex partner: Not on file     Emotionally abused: Not on file     Physically abused: Not on file     Forced sexual activity: Not on file   Other Topics Concern    Not on file   Social History Narrative    Not on file         ALLERGIES: Sulfa (sulfonamide antibiotics)    Review of Systems   Constitutional: Negative for fever. Respiratory: Negative for hemoptysis and sputum production. Cardiovascular: Positive for chest pain (on left side posteriorly). Gastrointestinal: Negative for abdominal pain and vomiting. Musculoskeletal: Negative for extremity weakness. Neurological: Negative for loss of consciousness. All other systems reviewed and are negative.       Vitals:    12/14/19 0151 12/14/19 0158 12/14/19 0330   BP: 153/72  (!) 127/106   Pulse: 60  60   Resp: 16  16   Temp: 97.6 °F (36.4 °C) SpO2: 97% 97% 97%   Weight: 83 kg (183 lb)     Height: 5' 1\" (1.549 m)              Physical Exam  Vitals signs and nursing note reviewed. Constitutional:       General: She is not in acute distress. Appearance: She is well-developed. HENT:      Head: Normocephalic and atraumatic. Eyes:      Conjunctiva/sclera: Conjunctivae normal.   Neck:      Musculoskeletal: Neck supple. Cardiovascular:      Rate and Rhythm: Normal rate and regular rhythm. Heart sounds: Normal heart sounds. Pulmonary:      Effort: Pulmonary effort is normal. No respiratory distress. Breath sounds: Normal breath sounds. Chest:      Chest wall: Tenderness present. No crepitus or edema. Comments: No bruising noted to chest wall  Abdominal:      General: There is no distension. Tenderness: There is no tenderness. There is no guarding. Musculoskeletal: Normal range of motion. General: No deformity. Skin:     General: Skin is warm and dry. Neurological:      Mental Status: She is alert. Cranial Nerves: No cranial nerve deficit. Psychiatric:         Behavior: Behavior normal.          MDM     56-year-old female presents after mechanical fall where she stepped forward and slipped on the floor falling to the ground striking her left side and back. She has exquisite tenderness of the left side of her chest, chest x-ray with ribs confirms 3 rib fractures without pneumothorax, hemothorax or other complication. Analgesia supplied here and patient was able to pull 1200+ on incentive spirometry without difficulty. She is ambulatory. No other signs of injury. We discussed significant risk of pneumonia with her age demographic but at this point we will attempt outpatient management with aggressive incentive spirometry, scheduled Tylenol and naproxen, and liberal oxycodone use for analgesia in the short-term with close follow-up with primary care physician this week for reexamination.   She is to return immediately with any productive cough, fever, difficulty breathing or other concerning signs.     Procedures

## 2019-12-14 NOTE — ED TRIAGE NOTES
Pt experienced at ground level fall at home. No LOC. Sipped on tile floor landing on a step. C/o L side rib pain 9/10.

## 2019-12-14 NOTE — ED NOTES
Patient given discharge papers and extensive education on the incentive spirometer. No further questions. Patient Ambulatory and no apparent distress.

## 2020-01-13 DIAGNOSIS — Z85.3 HISTORY OF BREAST CANCER: Primary | ICD-10-CM

## 2020-01-20 ENCOUNTER — OFFICE VISIT (OUTPATIENT)
Dept: SURGERY | Age: 75
End: 2020-01-20

## 2020-01-20 ENCOUNTER — HOSPITAL ENCOUNTER (OUTPATIENT)
Dept: MAMMOGRAPHY | Age: 75
Discharge: HOME | End: 2020-01-20
Attending: SURGERY
Payer: MEDICARE

## 2020-01-20 VITALS
HEIGHT: 61 IN | SYSTOLIC BLOOD PRESSURE: 138 MMHG | WEIGHT: 183 LBS | HEART RATE: 54 BPM | DIASTOLIC BLOOD PRESSURE: 60 MMHG | BODY MASS INDEX: 34.55 KG/M2

## 2020-01-20 DIAGNOSIS — Z17.0 MALIGNANT NEOPLASM OF UPPER-OUTER QUADRANT OF RIGHT BREAST IN FEMALE, ESTROGEN RECEPTOR POSITIVE (HCC): Primary | ICD-10-CM

## 2020-01-20 DIAGNOSIS — Z85.3 HISTORY OF LEFT BREAST CANCER: ICD-10-CM

## 2020-01-20 DIAGNOSIS — Z85.3 HISTORY OF BREAST CANCER: ICD-10-CM

## 2020-01-20 DIAGNOSIS — C50.411 MALIGNANT NEOPLASM OF UPPER-OUTER QUADRANT OF RIGHT BREAST IN FEMALE, ESTROGEN RECEPTOR POSITIVE (HCC): Primary | ICD-10-CM

## 2020-01-20 PROCEDURE — 77066 DX MAMMO INCL CAD BI: CPT

## 2020-01-20 NOTE — PROGRESS NOTES
HISTORY OF PRESENT ILLNESS  Navid Herzog is a 66 y.o. female. HPI  ESTABLISHED patient here for follow-up of RIGHT breast cancer, S/P lumpectomy in 2015. She is doing well. Would like to stop her Tamoxifen when she sees Dr. Parsons in May. Only complaint is that for about a month she has noticed pain in the center of her chest which radiates through to her back. When this happens, she gets breast pain in both breasts. This was helped yesterday with burping.       02/10/2015: RIGHT lumpectomy and SNBx of gr1 invasive ductal carcinoma with DCIS. 0/2 LN involved. ER+100%/MI+30% Her2-. Clear margins.     03/24/2015 - 04/24/2015: S/p XRT    04/2015 - 04/19/2016: Anastrozole (stopped due to joint pain)    05/01/2016 - 06/13/2016: Restarted anastrozole (stopped due to joint pain)    06/27/2016 - 07/18/206: Letrozole     08/01/2016 - : Tamoxifen      1992: LEFT lumpectomy and ALND for \"walnut sized tumor. \" 0/32 LN positive. S/P radiation and chemotherapy (for possibly 6 months). Tamoxifen for 2 years.       Past Medical History:   Diagnosis Date    Breast CA [facility]) 2015    Right Breast    Breast CA ([facility] [address] 1992    Left Breast    Cancer ([facility] [address] 1992    LEFT breast  followed by chemo and Tamoxifen    GERD (gastroesophageal reflux disease)     Hypercholesterolemia     Hypertension        Past Surgical History:   Procedure Laterality Date    HX BREAST BIOPSY Left 1992    dcis    HX BREAST BIOPSY Right 2015    dcis    HX BREAST LUMPECTOMY Left 1992    LEFT    HX BREAST LUMPECTOMY Right 2015    HX CATARACT REMOVAL Right 10/2018    HX HYSTERECTOMY      Took out part of both ovaries    HX OOPHORECTOMY Bilateral     partial    HX TONSILLECTOMY      age 25       Social History     Socioeconomic History    Marital status:      Spouse name: Not on file    Number of children: Not on file    Years of education: Not on file    Highest education level: Not on file   Occupational History    Not on file   Social Needs    Financial resource strain: Not on file    Food insecurity:     Worry: Not on file     Inability: Not on file    Transportation needs:     Medical: Not on file     Non-medical: Not on file   Tobacco Use    Smoking status: Never Smoker    Smokeless tobacco: Never Used   Substance and Sexual Activity    Alcohol use: Yes     Alcohol/week: 0.8 standard drinks     Types: 1 Cans of beer per week     Comment: beer    Drug use: No    Sexual activity: Not on file   Lifestyle    Physical activity:     Days per week: Not on file     Minutes per session: Not on file    Stress: Not on file   Relationships    Social connections:     Talks on phone: Not on file     Gets together: Not on file     Attends Christianity service: Not on file     Active member of club or organization: Not on file     Attends meetings of clubs or organizations: Not on file     Relationship status: Not on file    Intimate partner violence:     Fear of current or ex partner: Not on file     Emotionally abused: Not on file     Physically abused: Not on file     Forced sexual activity: Not on file   Other Topics Concern    Not on file   Social History Narrative    Not on file       Current Outpatient Medications on File Prior to Visit   Medication Sig Dispense Refill    sucralfate (CARAFATE) 1 gram tablet Take 1 Tab by mouth four (4) times daily. 120 Tab 0    tamoxifen (NOLVADEX) 20 mg tablet TAKE 1 TABLET BY MOUTH ONCE DAILY 90 Tab 3    dicyclomine (BENTYL) 10 mg capsule Take 10 mg by mouth as needed.  metoprolol succinate (TOPROL-XL) 100 mg tablet Take 100 mg by mouth daily.  metoprolol succinate (TOPROL-XL) 25 mg XL tablet Take 25 mg by mouth nightly.  escitalopram oxalate (LEXAPRO) 10 mg tablet Take 10 mg by mouth daily.  rosuvastatin (CRESTOR) 10 mg tablet Take 10 mg by mouth nightly.  aspirin 81 mg chewable tablet Take 81 mg by mouth daily.       omeprazole (PRILOSEC) 10 mg capsule Take 10 mg by mouth daily.      diazepam (VALIUM) 5 mg tablet Take 1 Tab by mouth every six (6) hours as needed for Anxiety. 25 Tab 0     No current facility-administered medications on file prior to visit. Allergies   Allergen Reactions    Sulfa (Sulfonamide Antibiotics) Rash       OB History        3    Para   3    Term                AB        Living           SAB   0    TAB        Ectopic        Molar        Multiple        Live Births              Obstetric Comments   Menarche:  4. LMP: 28.  # of Children:  3. Age at Delivery of First Child:  11.   Hysterectomy/oophorectomy:  YES/NO. Breast Bx:  Yes. Hx of Breast Feeding:  No.  BCP:  No. Hormone therapy:  No.             ROS    Physical Exam  Exam conducted with a chaperone present. Cardiovascular:      Rate and Rhythm: Normal rate and regular rhythm. Heart sounds: Normal heart sounds. Pulmonary:      Breath sounds: Normal breath sounds. Chest:      Breasts: Breasts are symmetrical.         Right: Normal. No swelling, bleeding, inverted nipple, mass, nipple discharge, skin change or tenderness. Left: Normal. No swelling, bleeding, inverted nipple, mass, nipple discharge, skin change or tenderness. Lymphadenopathy:      Cervical:      Right cervical: No superficial, deep or posterior cervical adenopathy. Left cervical: No superficial, deep or posterior cervical adenopathy. Upper Body:      Right upper body: No supraclavicular or axillary adenopathy. Left upper body: No supraclavicular or axillary adenopathy. ASSESSMENT and PLAN    ICD-10-CM ICD-9-CM    1. Malignant neoplasm of upper-outer quadrant of right breast in female, estrogen receptor positive (HCC) C50.411 174.4     Z17.0 V86.0    2. History of left breast cancer Z85.3 V10.3       Patient presents to f/u on RIGHT breast cancer, and is doing well overall. Well healed incisions s/p BL lumpectomies, no evidence of local recurrence.  Chronic seroma at RIGHT breast UOQ, also seen on mammogram. Discussed plan to stop Tamoxifen after she has finished current refill. Pt is 5 years BHARATI. Pt to continue annual mammography, but does not need to continue follow up here. Advised pt to follow up with her PCP for symptoms consistent with GERD and hiatal hernia (chest pain and burping). F/U with me PRN. This plan was reviewed with the patient and patient agrees. All questions were answered.     Written by Vale Gaston, as dictated by Dr. Henry Quiñones MD.

## 2020-01-20 NOTE — PROGRESS NOTES
HISTORY OF PRESENT ILLNESS Con Correa is a 66 y.o. female. HPI   ESTABLISHED patient here for follow-up of RIGHT breast cancer, S/P lumpectomy in 2015. She is doing well. Would like to stop her Tamoxifen when she sees Dr. Yaima Alvarado in May. Only complaint is that for about a month she has noticed pain in the center of her chest which radiates through to her back. When this happens, she gets breast pain in both breasts. This was helped yesterday with burping.   
 
02/10/2015: RIGHT lumpectomy and SNBx of gr1 invasive ductal carcinoma with DCIS. 0/2 LN involved. ER+100%/[place]+30% Her2-. Clear margins.  
03/24/2015 - 04/24/2015: S/p XRT 
04/2015 - 04/19/2016: Anastrozole (stopped due to joint pain) 05/01/2016 - 06/13/2016: Restarted anastrozole (stopped due to joint pain) 06/27/2016 - 07/18/206: Letrozole 08/01/2016 - : Tamoxifen ROS Physical Exam 
 
ASSESSMENT and PLAN 
{ASSESSMENT/PLAN:[id number]}

## 2020-01-22 NOTE — COMMUNICATION BODY
HISTORY OF PRESENT ILLNESS  Clyde Davis is a 66 y.o. female. HPI  ESTABLISHED patient here for follow-up of RIGHT breast cancer, S/P lumpectomy in 2015. She is doing well. Would like to stop her Tamoxifen when she sees Dr. Elijah Covarrubias in May. Only complaint is that for about a month she has noticed pain in the center of her chest which radiates through to her back. When this happens, she gets breast pain in both breasts. This was helped yesterday with burping.       02/10/2015: RIGHT lumpectomy and SNBx of gr1 invasive ductal carcinoma with DCIS. 0/2 LN involved. ER+100%/[place]+30% Her2-. Clear margins.     03/24/2015 - 04/24/2015: S/p XRT    04/2015 - 04/19/2016: Anastrozole (stopped due to joint pain)    05/01/2016 - 06/13/2016: Restarted anastrozole (stopped due to joint pain)    06/27/2016 - 07/18/206: Letrozole     08/01/2016 - : Tamoxifen      1992: LEFT lumpectomy and ALND for \"walnut sized tumor. \" 0/32 LN positive. S/P radiation and chemotherapy (for possibly 6 months). Tamoxifen for 2 years.       Past Medical History:   Diagnosis Date    Breast [facility]) 2015    Right Breast    Breast CA ([facility] [address] 1992    Left Breast    Cancer ([facility] [address] 1992    LEFT breast  followed by chemo and Tamoxifen    GERD (gastroesophageal reflux disease)     Hypercholesterolemia     Hypertension        Past Surgical History:   Procedure Laterality Date    HX BREAST BIOPSY Left 1992    dcis    HX BREAST BIOPSY Right 2015    dcis    HX BREAST LUMPECTOMY Left 1992    LEFT    HX BREAST LUMPECTOMY Right 2015    HX CATARACT REMOVAL Right 10/2018    HX HYSTERECTOMY      Took out part of both ovaries    HX OOPHORECTOMY Bilateral     partial    HX TONSILLECTOMY      age 25       Social History     Socioeconomic History    Marital status:      Spouse name: Not on file    Number of children: Not on file    Years of education: Not on file    Highest education level: Not on file   Occupational History    Not on file   Social Needs    Financial resource strain: Not on file    Food insecurity:     Worry: Not on file     Inability: Not on file    Transportation needs:     Medical: Not on file     Non-medical: Not on file   Tobacco Use    Smoking status: Never Smoker    Smokeless tobacco: Never Used   Substance and Sexual Activity    Alcohol use: Yes     Alcohol/week: 0.8 standard drinks     Types: 1 Cans of beer per week     Comment: beer    Drug use: No    Sexual activity: Not on file   Lifestyle    Physical activity:     Days per week: Not on file     Minutes per session: Not on file    Stress: Not on file   Relationships    Social connections:     Talks on phone: Not on file     Gets together: Not on file     Attends Nondenominational service: Not on file     Active member of club or organization: Not on file     Attends meetings of clubs or organizations: Not on file     Relationship status: Not on file    Intimate partner violence:     Fear of current or ex partner: Not on file     Emotionally abused: Not on file     Physically abused: Not on file     Forced sexual activity: Not on file   Other Topics Concern    Not on file   Social History Narrative    Not on file       Current Outpatient Medications on File Prior to Visit   Medication Sig Dispense Refill    sucralfate (CARAFATE) 1 gram tablet Take 1 Tab by mouth four (4) times daily. 120 Tab 0    tamoxifen (NOLVADEX) 20 mg tablet TAKE 1 TABLET BY MOUTH ONCE DAILY 90 Tab 3    dicyclomine (BENTYL) 10 mg capsule Take 10 mg by mouth as needed.  metoprolol succinate (TOPROL-XL) 100 mg tablet Take 100 mg by mouth daily.  metoprolol succinate (TOPROL-XL) 25 mg XL tablet Take 25 mg by mouth nightly.  escitalopram oxalate (LEXAPRO) 10 mg tablet Take 10 mg by mouth daily.  rosuvastatin (CRESTOR) 10 mg tablet Take 10 mg by mouth nightly.  aspirin 81 mg chewable tablet Take 81 mg by mouth daily.       omeprazole (PRILOSEC) 10 mg capsule Take 10 mg by mouth daily.      diazepam (VALIUM) 5 mg tablet Take 1 Tab by mouth every six (6) hours as needed for Anxiety. 25 Tab 0     No current facility-administered medications on file prior to visit. Allergies   Allergen Reactions    Sulfa (Sulfonamide Antibiotics) Rash       OB History        3    Para   3    Term                AB        Living           SAB   0    TAB        Ectopic        Molar        Multiple        Live Births              Obstetric Comments   Menarche:  4. LMP: 28.  # of Children:  3. Age at Delivery of First Child:  11.   Hysterectomy/oophorectomy:  YES/NO. Breast Bx:  Yes. Hx of Breast Feeding:  No.  BCP:  No. Hormone therapy:  No.             ROS    Physical Exam  Exam conducted with a chaperone present. Cardiovascular:      Rate and Rhythm: Normal rate and regular rhythm. Heart sounds: Normal heart sounds. Pulmonary:      Breath sounds: Normal breath sounds. Chest:      Breasts: Breasts are symmetrical.         Right: Normal. No swelling, bleeding, inverted nipple, mass, nipple discharge, skin change or tenderness. Left: Normal. No swelling, bleeding, inverted nipple, mass, nipple discharge, skin change or tenderness. Lymphadenopathy:      Cervical:      Right cervical: No superficial, deep or posterior cervical adenopathy. Left cervical: No superficial, deep or posterior cervical adenopathy. Upper Body:      Right upper body: No supraclavicular or axillary adenopathy. Left upper body: No supraclavicular or axillary adenopathy. ASSESSMENT and PLAN    ICD-10-CM ICD-9-CM    1. Malignant neoplasm of upper-outer quadrant of right breast in female, estrogen receptor positive (HCC) C50.411 174.4     Z17.0 V86.0    2. History of left breast cancer Z85.3 V10.3       Patient presents to f/u on RIGHT breast cancer, and is doing well overall. Well healed incisions s/p BL lumpectomies, no evidence of local recurrence.  Chronic seroma at RIGHT breast UOQ, also seen on mammogram. Discussed plan to stop Tamoxifen after she has finished current refill. Pt is 5 years BHARATI. Pt to continue annual mammography, but does not need to continue follow up here. Advised pt to follow up with her PCP for symptoms consistent with GERD and hiatal hernia (chest pain and burping). F/U with me PRN. This plan was reviewed with the patient and patient agrees. All questions were answered.     Written by Michael Morris, as dictated by Dr. Samina Tan MD.

## 2020-09-28 ENCOUNTER — TRANSCRIBE ORDER (OUTPATIENT)
Dept: SCHEDULING | Age: 75
End: 2020-09-28

## 2020-09-28 DIAGNOSIS — N64.4 BREAST TENDERNESS: Primary | ICD-10-CM

## 2020-10-13 ENCOUNTER — OFFICE VISIT (OUTPATIENT)
Dept: ONCOLOGY | Age: 75
End: 2020-10-13
Payer: MEDICARE

## 2020-10-13 VITALS
TEMPERATURE: 97.9 F | SYSTOLIC BLOOD PRESSURE: 171 MMHG | RESPIRATION RATE: 18 BRPM | HEIGHT: 61 IN | OXYGEN SATURATION: 96 % | WEIGHT: 189 LBS | BODY MASS INDEX: 35.68 KG/M2 | DIASTOLIC BLOOD PRESSURE: 80 MMHG | HEART RATE: 56 BPM

## 2020-10-13 DIAGNOSIS — R07.89 ACUTE CHEST WALL PAIN: ICD-10-CM

## 2020-10-13 DIAGNOSIS — I89.0 LYMPHEDEMA: ICD-10-CM

## 2020-10-13 DIAGNOSIS — C50.411 MALIGNANT NEOPLASM OF UPPER-OUTER QUADRANT OF RIGHT FEMALE BREAST, UNSPECIFIED ESTROGEN RECEPTOR STATUS (HCC): Primary | ICD-10-CM

## 2020-10-13 DIAGNOSIS — I10 BENIGN ESSENTIAL HTN: ICD-10-CM

## 2020-10-13 DIAGNOSIS — K21.9 GASTROESOPHAGEAL REFLUX DISEASE WITHOUT ESOPHAGITIS: ICD-10-CM

## 2020-10-13 PROCEDURE — 1101F PT FALLS ASSESS-DOCD LE1/YR: CPT | Performed by: INTERNAL MEDICINE

## 2020-10-13 PROCEDURE — G8536 NO DOC ELDER MAL SCRN: HCPCS | Performed by: INTERNAL MEDICINE

## 2020-10-13 PROCEDURE — G8427 DOCREV CUR MEDS BY ELIG CLIN: HCPCS | Performed by: INTERNAL MEDICINE

## 2020-10-13 PROCEDURE — G0463 HOSPITAL OUTPT CLINIC VISIT: HCPCS | Performed by: NURSE PRACTITIONER

## 2020-10-13 PROCEDURE — 99214 OFFICE O/P EST MOD 30 MIN: CPT | Performed by: INTERNAL MEDICINE

## 2020-10-13 PROCEDURE — 1090F PRES/ABSN URINE INCON ASSESS: CPT | Performed by: INTERNAL MEDICINE

## 2020-10-13 PROCEDURE — G8432 DEP SCR NOT DOC, RNG: HCPCS | Performed by: INTERNAL MEDICINE

## 2020-10-13 PROCEDURE — G8419 CALC BMI OUT NRM PARAM NOF/U: HCPCS | Performed by: INTERNAL MEDICINE

## 2020-10-13 PROCEDURE — G8399 PT W/DXA RESULTS DOCUMENT: HCPCS | Performed by: INTERNAL MEDICINE

## 2020-10-13 RX ORDER — PANTOPRAZOLE SODIUM 40 MG/1
TABLET, DELAYED RELEASE ORAL
COMMUNITY
Start: 2020-09-01

## 2020-10-13 NOTE — PROGRESS NOTES
[address], [address] [facility]  Yael Black 17  W: [phone]  F: [phone]     f/u HEME/ONC CONSULT    Reason for visit: evaluation for treatment for   Breast Cancer    Consulting physician:  Dr. Zuly Matthews    HPI:   Cortez Frankel is a 66 y.o.  female who I was asked to see in consultation at the request of Dr. Gladis Ibrahim for evaluation for systemic therapy for breast cancer. She had left sided breast cancer in 1992, path report not available to me, treated with lumpectomy for a \"walnut sized tumor,\" 0/32 LN involved, followed by radiation and chemotherapy (for possibly 6 months, so maybe CMF?) and then tamoxifen for 2 years (stopped by her due to not having evidence that more than 2 years was beneficial). She had an abnormal mammogram leading to a RIGHT breast biopsy on 1/19/2015, 0.7 cm of invasive carcinoma with mucinous features, gr 1, with DCIS, ER + at 100% and ID + at 30% for both invasive and DCIS component, HER 2 negative at IHC 0, ki67 18%. Right lumpectomy on 2/10/15 shows invasive ductal carcinoma, gr 1, no LVI, 0/2 LN involved, solid and cribriform DCIS focally present, negative but close DCIS margin. S/p xrt 3/24/15-4/14/15  Started anastrozole 4/2015-4/19/16 stopped due to joint pain  Restarted anastrozole 5/1/16-6/13/16 stopped due to joint pain  Letrozole 6/27/16-7/18/16  Tamoxifen 8/1/16-3/2020    Interval history: In today for follow up. Complains of gr 1 diarrhea, gr 1 fatigue, gr 1 cough, gr 1 tachycardia, gr 1 neuropathy, gr 1 headache. 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 Complains of severe, intermittent GERD over past couple of weeks      DX   Encounter Diagnoses   Name Primary?     Malignant neoplasm of upper-outer quadrant of right female breast, unspecified estrogen receptor status ([facility] [address] Yes    Benign essential HTN     Lymphedema     Acute chest wall pain     Gastroesophageal reflux disease without esophagitis         Past Medical History:   Diagnosis Date    Breast CA [facility]) 2015    Right Breast    Breast CA ([facility] [address] 1992    Left Breast    Cancer ([facility] [address] 1992    LEFT breast  followed by chemo and Tamoxifen    GERD (gastroesophageal reflux disease)     Hypercholesterolemia     Hypertension      Past Surgical History:   Procedure Laterality Date    HX BREAST BIOPSY Left 1992    dcis    HX BREAST BIOPSY Right 2015    dcis    HX BREAST LUMPECTOMY Left 1992    LEFT    HX BREAST LUMPECTOMY Right 2015    HX CATARACT REMOVAL Right 10/2018    HX HYSTERECTOMY      Took out part of both ovaries    HX OOPHORECTOMY Bilateral     partial    HX TONSILLECTOMY      age 25     Social History     Socioeconomic History    Marital status:      Spouse name: Not on file    Number of children: Not on file    Years of education: Not on file    Highest education level: Not on file   Tobacco Use    Smoking status: Never Smoker    Smokeless tobacco: Never Used   Substance and Sexual Activity    Alcohol use: Yes     Alcohol/week: 0.8 standard drinks     Types: 1 Cans of beer per week     Comment: beer    Drug use: No     Family History   Problem Relation Age of Onset    Cancer Mother         Lung CA, was a smoker    Heart Disease Father        Current Outpatient Medications   Medication Sig Dispense Refill    pantoprazole (PROTONIX) 40 mg tablet TAKE 1 TABLET BY MOUTH ONCE DAILY FOR 90 DAYS      dicyclomine (BENTYL) 10 mg capsule Take 10 mg by mouth as needed.  metoprolol succinate (TOPROL-XL) 100 mg tablet Take 100 mg by mouth daily.  metoprolol succinate (TOPROL-XL) 25 mg XL tablet Take 25 mg by mouth nightly.  escitalopram oxalate (LEXAPRO) 10 mg tablet Take 10 mg by mouth daily.  rosuvastatin (CRESTOR) 10 mg tablet Take 10 mg by mouth nightly.  aspirin 81 mg chewable tablet Take 81 mg by mouth daily.       diazepam (VALIUM) 5 mg tablet Take 1 Tab by mouth every six (6) hours as needed for Anxiety. 25 Tab 0    tamoxifen (NOLVADEX) 20 mg tablet TAKE 1 TABLET BY MOUTH ONCE DAILY 90 Tab 3       Allergies   Allergen Reactions    Sulfa (Sulfonamide Antibiotics) Rash       Review of Systems    A comprehensive review of systems was performed and all systems were negative except for HPI and for the symptom report form, reviewed and scanned in.    Objective:  Physical Exam:  Visit Vitals  BP (!) 171/80 (BP 1 Location: Right arm, BP Patient Position: Sitting)   Pulse (!) 56   Temp 97.9 °F (36.6 °C) (Temporal)   Resp 18   Ht 5' 1\" (1.549 m)   Wt 189 lb (85.7 kg)   SpO2 96%   BMI 35.71 kg/m²     General: No distress  Eyes: Anicteric sclerae  HENT: Atraumatic  Neck: Supple  Respiratory: Normal respiratory effort  CV: No peripheral edema  GI: nontender  Skin: No rashes, ecchymoses, or petechiae  Psych: Alert, oriented, appropriate affect, normal judgment/insight       Breasts: Healed lumpectomy scars to bilateral breasts, no pain/tenderness. Diagnostic Imaging     Results for orders placed during the hospital encounter of 08/31/18   CT CHEST WO CONT    Narrative INDICATION: Pt with hx of breast cancer and ongoing throat pain    COMPARISON: None    CONTRAST: None. TECHNIQUE:  5 mm axial images were obtained through the chest. Coronal and  sagittal reconstructions were generated. CT dose reduction was achieved through  use of a standardized protocol tailored for this examination and automatic  exposure control for dose modulation. Adaptive statistical iterative  reconstruction (ASIR) was utilized. The absence of intravenous contrast reduces the sensitivity for evaluation of  the mediastinum and upper abdominal organs. FINDINGS:    THYROID: No nodule. MEDIASTINUM: No mass or lymphadenopathy. JONATAN: No mass or lymphadenopathy. THORACIC AORTA: No aneurysm. MAIN PULMONARY ARTERY: Normal in caliber. TRACHEA/BRONCHI: Patent. ESOPHAGUS: No wall thickening or dilatation.   HEART: There is mild cardiomegaly. PLEURA: No effusion or pneumothorax. LUNGS: No nodule, mass, or airspace disease. INCIDENTALLY IMAGED UPPER ABDOMEN: No focal abnormality. BONES: No destructive bone lesion. There is an oval mixed density lesion in the central right breast, presumably a  postoperative seroma, measuring 3.2 x 3.4 cm. There are postsurgical clips in  the left axilla and within the left breast.  The thoracic esophagus is not distended. Impression IMPRESSION:    1. No evidence of thoracic metastatic disease or acute process. 2. Postsurgical changes within both breasts. 3. Mild cardiomegaly. 3/10/15 dexa  REPORT:  Bone Mineral Density  Indication: screening for osteoporosis  Age: 68  Sex: Female. Menopause status: postmenopausal.  Hormone replacement therapy: No   Risk factors for fall: frequent falls   Risk factors for osteoporosis: AI  Current medication for osteoporosis: Calcium and vitamin D.   Comparison: 2002  Technique: Imaging was performed on the [employer] meta-analysis fracture risk calculator (FRAX) analysis   was performed for 10 year fracture risk probability assessment  Excluded sites: 0  Findings:  Fractures identified on Lateral scanogram: 0  Lumbar spine: L1-4  Bone mineral density (gm/cm2): 1.250  % of peak bone mass: 105  % for age matched controls: 80  T score: 0.4  Z score: 1.5  Hip: Right femoral neck  Bone mineral density (gm/cm2): 0.993  % of peak bone mass: 96  % for age matched controls: 80  T score: -0.3  Z score: 1  IMPRESSION:  This patient is normal using the World Health Organization criteria  As compared to the prior study, there has been a significant 5.3% increase   in the left total hip, a significant 19.2% increase in the lumbar spine  10 year probability of major osteoporotic fracture: 7.2%  10 year probability of hip fracture: 0.5%    10/19/15 bilateral mammogram  FINDINGS: Bilateral digital diagnostic mammography was performed, and is   interpreted in conjunction with a computer assisted detection (CAD) system. Within the upper outer quadrant of the right breast, there is a large   rounded mass, possibly representing a postoperative seroma. The left breast   postoperative changes are stable. No suspicious calcifications are   identified. There is no skin thickening or nipple retraction. Targeted evaluation of the upper-outer quadrant of the right breast is   performed. Within the 10:00 position of the right breast, there is a 4.8 CM   by 3.4 CM by 5.9 CM low circumscribed complex avascular collection, likely   representing a seroma. IMPRESSION: BI-RADS 3. Probable benign finding. Recommend right breast 6   month short interval followup, unless earlier clinical indication. Findings   were discussed with the patient at the time of interpretation. 4/18/16 R mammo   IMPRESSION:  1. BI-RADS category 3, probably benign. 2. The patient should return in 6 months for bilateral mammography. 3. She was informed.     10/31/16 [place] mammo: negative    3/13/17 dexa  Lumbar spine: L1-L4  Bone mineral density (gm/cm2): 1.183  % of peak bone mass: 99  % for age matched controls: 80  T score: -0.1  Z score: 1.1      Total hip: Left  Bone mineral density (gm/cm2): 1.064  % of peak bone mass: 106  % for age matched controls: 80  T score: 0.4  Z score: 1.7     Femoral neck: Left  Bone mineral density (gm/cm2): 0.961  % of peak bone mass: 93  % for age matched controls: 80  T score: -0.6  Z score: 0.9     Forearm: Left  Bone mineral density (gm/cm2): 0.789  % of peak bone mass: 90  % for age matched controls: 80  T score: -1.0  Z score: 1.1     Impression:      This patient is normal using the World Health Organization criteria    1/7/19 mammogram   Negative    8/31/18 ct chest wo contrast  Negative    1/20/2020 Bilat mammo: benign    Lab Results  No results found for: WBC    No results found for: NA      Assessment/Plan:  66 y.o. female with [id number] right breast cancer, ER +, NM +, HER 2 negative, gr 1, 0/2 LN involved, 0.7 cm. PS 0    1. Right Breast cancer stage: IA    Hormonal therapy: administered     With no further therapy, her RR is 14%. Treatment with endocrine therapy will decrease her RR to 7%. Chemotherapy would only add an 1% benefit. No evidence of recurrence, patient states she stopped taking tamoxifen in 3/2020. Bilateral mammogram on 1/20/20, benign. Also had follow up with Dr. Jose De La Rosa this day. Next mammo due 1/2021, ordered. Last eye exam: 4/2019  Last gyn exam: s/p hysterectomy    2. Hypertension: Elevated today. On metroprolol. Managed by PCP and cardiologist.  Reports she had follow up with cardiologist a few months ago. 3. Emotional well being: She is coping well with her disease and has great support. 4. Lung nodule: CT scan 8/2018 negative    5. Back pain, lower, chronic, stable. 6. Lymphedema: in left arm. Has been seen in  lymphedema     7. HM:  Saw Dr. Bianca Cavanaugh, had a cologuard negative from PCP    8. GERD:  Currently on Protonix and Bentyl. Managed by PCP. 9.  Chest wall pain:  Ongoing since 7/2020 but has improved. Has seen PCP who ordered CXR which was benign. Reports intermittent breast pain and reports pain is aggravated by pulling/pushing/lift ROM. Will get breast MRI      Thank you for this consult. All of the patient's questions were answered today. There are no Patient Instructions on file for this visit.        Tho Keller MD

## 2020-11-03 ENCOUNTER — HOSPITAL ENCOUNTER (OUTPATIENT)
Dept: MRI IMAGING | Age: 75
Discharge: HOME | End: 2020-11-03
Attending: INTERNAL MEDICINE
Payer: MEDICARE

## 2020-11-03 DIAGNOSIS — C50.411 MALIGNANT NEOPLASM OF UPPER-OUTER QUADRANT OF RIGHT FEMALE BREAST, UNSPECIFIED ESTROGEN RECEPTOR STATUS (HCC): ICD-10-CM

## 2020-11-03 DIAGNOSIS — R07.89 ACUTE CHEST WALL PAIN: ICD-10-CM

## 2020-11-03 PROCEDURE — A9585 GADOBUTROL INJECTION: HCPCS | Performed by: INTERNAL MEDICINE

## 2020-11-03 PROCEDURE — 77049 MRI BREAST C-+ W/CAD BI: CPT

## 2020-11-03 PROCEDURE — 74011250636 HC RX REV CODE- 250/636: Performed by: INTERNAL MEDICINE

## 2020-11-03 PROCEDURE — 82565 ASSAY OF CREATININE: CPT

## 2020-11-03 RX ADMIN — GADOBUTROL 9 ML: 604.72 INJECTION INTRAVENOUS at 15:51

## 2020-11-04 ENCOUNTER — TELEPHONE (OUTPATIENT)
Dept: ONCOLOGY | Age: 75
End: 2020-11-04

## 2020-11-04 LAB — CREAT BLD-MCNC: 0.7 MG/DL (ref 0.6–1.3)

## 2020-11-04 NOTE — TELEPHONE ENCOUNTER
11/4/2020 6:26 PM: Called patient and advised that her breast MRI is benign but did show some rib fractures, which may be causing her pain. Patient stated that she fell on 12/13/2019 but has not fallen or had any trauma to the area since then. Patient stated she will follow up with her PCP and was appreciative of the call.     ----- Message from Michelle Brumfield NP sent at 11/4/2020  3:48 PM EST -----  Breast mri is benign, however did show some rib fractures which may be cause of her pain.

## 2020-12-28 ENCOUNTER — TRANSCRIBE ORDER (OUTPATIENT)
Dept: SCHEDULING | Age: 75
End: 2020-12-28

## 2020-12-28 DIAGNOSIS — Z12.31 VISIT FOR SCREENING MAMMOGRAM: Primary | ICD-10-CM

## 2021-04-29 ENCOUNTER — TRANSCRIBE ORDER (OUTPATIENT)
Dept: SCHEDULING | Age: 76
End: 2021-04-29

## 2021-04-29 DIAGNOSIS — R10.13 EPIGASTRIC PAIN: Primary | ICD-10-CM

## 2021-05-13 ENCOUNTER — HOSPITAL ENCOUNTER (OUTPATIENT)
Dept: MAMMOGRAPHY | Age: 76
Discharge: HOME | End: 2021-05-13
Attending: INTERNAL MEDICINE
Payer: MEDICARE

## 2021-05-13 DIAGNOSIS — Z12.31 VISIT FOR SCREENING MAMMOGRAM: ICD-10-CM

## 2021-05-13 PROCEDURE — 77067 SCR MAMMO BI INCL CAD: CPT

## 2021-05-17 ENCOUNTER — HOSPITAL ENCOUNTER (OUTPATIENT)
Dept: ULTRASOUND IMAGING | Age: 76
Discharge: HOME | End: 2021-05-17
Attending: FAMILY MEDICINE
Payer: MEDICARE

## 2021-05-17 DIAGNOSIS — R10.13 EPIGASTRIC PAIN: ICD-10-CM

## 2021-05-17 PROCEDURE — 76700 US EXAM ABDOM COMPLETE: CPT

## 2022-03-24 ENCOUNTER — TRANSCRIBE ORDER (OUTPATIENT)
Dept: SCHEDULING | Age: 77
End: 2022-03-24

## 2022-03-24 DIAGNOSIS — Z12.31 VISIT FOR SCREENING MAMMOGRAM: Primary | ICD-10-CM

## 2022-05-18 ENCOUNTER — HOSPITAL ENCOUNTER (OUTPATIENT)
Dept: MAMMOGRAPHY | Age: 77
Discharge: HOME | End: 2022-05-18
Attending: FAMILY MEDICINE
Payer: MEDICARE

## 2022-05-18 DIAGNOSIS — Z12.31 VISIT FOR SCREENING MAMMOGRAM: ICD-10-CM

## 2022-05-18 PROCEDURE — 77067 SCR MAMMO BI INCL CAD: CPT

## 2023-04-12 NOTE — PROGRESS NOTES
Faby Monae is a 66 y.o. female Follow up for the Evaluation of Breast Cancer. 1. Have you been to the ER, urgent care clinic since your last visit? Hospitalized since your last visit? Simin Mendiola on 12/13 and broke 3 ribs--Went to [facility] 2. Have you seen or consulted any other health care providers outside of the [address] [place] [address] since your last visit? Include any pap smears or colon screening.  No Patient was placed on the waiting list. Anita will be call upon her name has been reached from the list.

## 2023-05-26 RX ORDER — PANTOPRAZOLE SODIUM 40 MG/1
TABLET, DELAYED RELEASE ORAL
COMMUNITY
Start: 2020-09-01

## 2023-05-26 RX ORDER — DIAZEPAM 5 MG/1
5 TABLET ORAL EVERY 6 HOURS PRN
COMMUNITY
Start: 2011-10-21

## 2023-05-26 RX ORDER — METOPROLOL SUCCINATE 100 MG/1
100 TABLET, EXTENDED RELEASE ORAL DAILY
COMMUNITY
Start: 2018-04-13

## 2023-05-26 RX ORDER — METOPROLOL SUCCINATE 25 MG/1
25 TABLET, EXTENDED RELEASE ORAL NIGHTLY
COMMUNITY
Start: 2018-04-16

## 2023-05-26 RX ORDER — DICYCLOMINE HYDROCHLORIDE 10 MG/1
10 CAPSULE ORAL PRN
COMMUNITY
Start: 2019-01-29

## 2023-05-26 RX ORDER — ESCITALOPRAM OXALATE 10 MG/1
10 TABLET ORAL DAILY
COMMUNITY
Start: 2018-05-16

## 2023-05-26 RX ORDER — ROSUVASTATIN CALCIUM 10 MG/1
10 TABLET, COATED ORAL NIGHTLY
COMMUNITY

## 2023-05-26 RX ORDER — ASPIRIN 81 MG/1
81 TABLET, CHEWABLE ORAL DAILY
COMMUNITY

## 2023-05-26 RX ORDER — TAMOXIFEN CITRATE 20 MG/1
1 TABLET ORAL DAILY
COMMUNITY
Start: 2019-03-26

## 2023-07-20 ENCOUNTER — TRANSCRIBE ORDERS (OUTPATIENT)
Facility: HOSPITAL | Age: 78
End: 2023-07-20

## 2023-07-20 DIAGNOSIS — Z12.31 VISIT FOR SCREENING MAMMOGRAM: Primary | ICD-10-CM

## 2023-11-02 ENCOUNTER — HOSPITAL ENCOUNTER (OUTPATIENT)
Facility: HOSPITAL | Age: 78
Discharge: HOME | End: 2023-11-02
Payer: MEDICARE

## 2023-11-02 VITALS — BODY MASS INDEX: 33.04 KG/M2 | HEIGHT: 61 IN | WEIGHT: 175 LBS

## 2023-11-02 DIAGNOSIS — Z12.31 VISIT FOR SCREENING MAMMOGRAM: ICD-10-CM

## 2023-11-02 PROCEDURE — 77063 BREAST TOMOSYNTHESIS BI: CPT

## 2024-03-19 ENCOUNTER — TELEPHONE (OUTPATIENT)
Age: 79
End: 2024-03-19

## 2024-03-19 NOTE — TELEPHONE ENCOUNTER
[facility] [place] at [facility]  [phone]    03/19/24 10:06 AM EDT -  Called patient and advised her to get in touch with Dr. Smith's office to see if they could get her in to assess the lump, per Dr. Sibley. Patient stated that she would reach out to him. Patient had no other questions at this time.

## 2024-03-19 NOTE — TELEPHONE ENCOUNTER
Patient called and stated she found a lump in her right breast and would like to be advised on who she should make an appointment with.  CB#[phone]

## 2024-04-08 ENCOUNTER — OFFICE VISIT (OUTPATIENT)
Age: 79
End: 2024-04-08
Payer: MEDICARE

## 2024-04-08 ENCOUNTER — TELEPHONE (OUTPATIENT)
Age: 79
End: 2024-04-08

## 2024-04-08 VITALS — HEIGHT: 61 IN | WEIGHT: 176 LBS | BODY MASS INDEX: 33.23 KG/M2

## 2024-04-08 DIAGNOSIS — N63.0 NODULE OF SKIN OF BREAST: Primary | ICD-10-CM

## 2024-04-08 PROCEDURE — 99203 OFFICE O/P NEW LOW 30 MIN: CPT | Performed by: SURGERY

## 2024-04-08 PROCEDURE — 1123F ACP DISCUSS/DSCN MKR DOCD: CPT | Performed by: SURGERY

## 2024-04-08 PROCEDURE — 76642 ULTRASOUND BREAST LIMITED: CPT | Performed by: SURGERY

## 2024-04-16 ENCOUNTER — PREP FOR PROCEDURE (OUTPATIENT)
Age: 79
End: 2024-04-16

## 2024-04-16 DIAGNOSIS — N63.0 BREAST NODULE: Primary | ICD-10-CM

## 2024-04-24 ENCOUNTER — HOSPITAL ENCOUNTER (OUTPATIENT)
Facility: HOSPITAL | Age: 79
Discharge: HOME | End: 2024-04-27
Payer: MEDICARE

## 2024-04-24 VITALS
DIASTOLIC BLOOD PRESSURE: 62 MMHG | TEMPERATURE: 97.3 F | HEIGHT: 61 IN | HEART RATE: 53 BPM | WEIGHT: 182.2 LBS | BODY MASS INDEX: 34.4 KG/M2 | SYSTOLIC BLOOD PRESSURE: 140 MMHG | RESPIRATION RATE: 16 BRPM

## 2024-04-24 LAB
ANION GAP SERPL CALC-SCNC: 5 MMOL/L (ref 5–15)
BUN SERPL-MCNC: 14 MG/DL (ref 6–20)
BUN/CREAT SERPL: 20 (ref 12–20)
CALCIUM SERPL-MCNC: 8.8 MG/DL (ref 8.5–10.1)
CHLORIDE SERPL-SCNC: 106 MMOL/L (ref 97–108)
CO2 SERPL-SCNC: 30 MMOL/L (ref 21–32)
CREAT SERPL-MCNC: 0.69 MG/DL (ref 0.55–1.02)
EKG ATRIAL RATE: 52 BPM
EKG DIAGNOSIS: NORMAL
EKG P AXIS: 53 DEGREES
EKG P-R INTERVAL: 184 MS
EKG Q-T INTERVAL: 450 MS
EKG QRS DURATION: 68 MS
EKG QTC CALCULATION (BAZETT): 418 MS
EKG R AXIS: -9 DEGREES
EKG T AXIS: 32 DEGREES
EKG VENTRICULAR RATE: 52 BPM
GLUCOSE SERPL-MCNC: 82 MG/DL (ref 65–100)
POTASSIUM SERPL-SCNC: 4.2 MMOL/L (ref 3.5–5.1)
SODIUM SERPL-SCNC: 141 MMOL/L (ref 136–145)

## 2024-04-24 PROCEDURE — 36415 COLL VENOUS BLD VENIPUNCTURE: CPT

## 2024-04-24 PROCEDURE — 93005 ELECTROCARDIOGRAM TRACING: CPT | Performed by: SURGERY

## 2024-04-24 PROCEDURE — 80048 BASIC METABOLIC PNL TOTAL CA: CPT

## 2024-04-24 RX ORDER — OMEPRAZOLE 40 MG/1
40 CAPSULE, DELAYED RELEASE ORAL EVERY MORNING
COMMUNITY

## 2024-04-24 RX ORDER — MULTIVIT WITH MINERALS/LUTEIN
500 TABLET ORAL NIGHTLY
COMMUNITY

## 2024-04-24 RX ORDER — CELECOXIB 100 MG/1
100 CAPSULE ORAL AS NEEDED
COMMUNITY

## 2024-04-24 NOTE — PERIOP NOTE
Patient states that her PCP recommended her to take aspirin daily for preventative measures, she does see a cardiologist but does not have any implants or stents. Instructed patient to stop taking her aspirin 5 days before surgery per anesthesia protocol.    Called Dr Davison's office (cardiology) and requested last OVN faxed to #[phone]    During PAT visit patient stated that she is having the excision of lesion on her Right breast but the order for consent and posting says Left breast. Called Dr Friedman's office and spoke to his surgery scheduler (Winifred) and she stated that she will fix the consent order and posting to read Right breast excision.  
surgery  Wear loose, comfortable, clean clothes  Wear glasses instead of contacts  Leave money, valuables, and jewelry, including body piercings, at home     Going Home - or Spending the Night SAME-DAY SURGERY: You must have a responsible adult drive you home and stay with you 24 hours after surgery  ADMITS: If your doctor is keeping you in the hospital after surgery, leave personal belongings/luggage in your car until you have a hospital room number.    Hospital discharge time is 12 noon  Drivers must be here before 12 noon unless you are told differently   Special Instructions        Follow all instructions so your surgery won’t be cancelled.  Please, be on time.                    If a situation occurs and you are delayed the day of surgery, call  [phone].    If your physical condition changes (like a fever, cold, flu, etc.) call your surgeon.    Home medication(s) reviewed and verified via    LIST   VERBAL   during PAT appointment.    The patient was contacted by   IN-PERSON  The patient verbalizes understanding of all instructions and    DOES NOT   need reinforcement.

## 2024-05-06 ENCOUNTER — ANESTHESIA EVENT (OUTPATIENT)
Facility: HOSPITAL | Age: 79
End: 2024-05-06
Payer: MEDICARE

## 2024-05-07 ENCOUNTER — ANESTHESIA (OUTPATIENT)
Facility: HOSPITAL | Age: 79
End: 2024-05-07
Payer: MEDICARE

## 2024-05-07 ENCOUNTER — HOSPITAL ENCOUNTER (OUTPATIENT)
Facility: HOSPITAL | Age: 79
Setting detail: OUTPATIENT SURGERY
Discharge: HOME | End: 2024-05-07
Attending: SURGERY | Admitting: SURGERY
Payer: MEDICARE

## 2024-05-07 VITALS
HEIGHT: 61 IN | BODY MASS INDEX: 33.71 KG/M2 | OXYGEN SATURATION: 100 % | HEART RATE: 52 BPM | SYSTOLIC BLOOD PRESSURE: 156 MMHG | TEMPERATURE: 97.7 F | DIASTOLIC BLOOD PRESSURE: 57 MMHG | WEIGHT: 178.57 LBS | RESPIRATION RATE: 9 BRPM

## 2024-05-07 DIAGNOSIS — N63.0 BREAST NODULE: Primary | ICD-10-CM

## 2024-05-07 PROCEDURE — 3700000001 HC ADD 15 MINUTES (ANESTHESIA): Performed by: SURGERY

## 2024-05-07 PROCEDURE — 19120 REMOVAL OF BREAST LESION: CPT | Performed by: SURGERY

## 2024-05-07 PROCEDURE — 2500000003 HC RX 250 WO HCPCS: Performed by: SURGERY

## 2024-05-07 PROCEDURE — 7100000011 HC PHASE II RECOVERY - ADDTL 15 MIN: Performed by: SURGERY

## 2024-05-07 PROCEDURE — 88341 IMHCHEM/IMCYTCHM EA ADD ANTB: CPT

## 2024-05-07 PROCEDURE — 2709999900 HC NON-CHARGEABLE SUPPLY: Performed by: SURGERY

## 2024-05-07 PROCEDURE — 88360 TUMOR IMMUNOHISTOCHEM/MANUAL: CPT

## 2024-05-07 PROCEDURE — 6360000002 HC RX W HCPCS: Performed by: NURSE ANESTHETIST, CERTIFIED REGISTERED

## 2024-05-07 PROCEDURE — 88305 TISSUE EXAM BY PATHOLOGIST: CPT

## 2024-05-07 PROCEDURE — 2500000003 HC RX 250 WO HCPCS: Performed by: NURSE ANESTHETIST, CERTIFIED REGISTERED

## 2024-05-07 PROCEDURE — 3700000000 HC ANESTHESIA ATTENDED CARE: Performed by: SURGERY

## 2024-05-07 PROCEDURE — 7100000000 HC PACU RECOVERY - FIRST 15 MIN: Performed by: SURGERY

## 2024-05-07 PROCEDURE — 3600000013 HC SURGERY LEVEL 3 ADDTL 15MIN: Performed by: SURGERY

## 2024-05-07 PROCEDURE — 6360000002 HC RX W HCPCS: Performed by: SURGERY

## 2024-05-07 PROCEDURE — 2580000003 HC RX 258: Performed by: SURGERY

## 2024-05-07 PROCEDURE — 2580000003 HC RX 258: Performed by: ANESTHESIOLOGY

## 2024-05-07 PROCEDURE — 7100000010 HC PHASE II RECOVERY - FIRST 15 MIN: Performed by: SURGERY

## 2024-05-07 PROCEDURE — 88365 INSITU HYBRIDIZATION (FISH): CPT

## 2024-05-07 PROCEDURE — 88342 IMHCHEM/IMCYTCHM 1ST ANTB: CPT

## 2024-05-07 PROCEDURE — 3600000003 HC SURGERY LEVEL 3 BASE: Performed by: SURGERY

## 2024-05-07 RX ORDER — OXYCODONE HYDROCHLORIDE AND ACETAMINOPHEN 5; 325 MG/1; MG/1
1 TABLET ORAL EVERY 4 HOURS PRN
Qty: 8 TABLET | Refills: 0 | Status: SHIPPED | OUTPATIENT
Start: 2024-05-07 | End: 2024-05-10

## 2024-05-07 RX ORDER — MIDAZOLAM HYDROCHLORIDE 2 MG/2ML
2 INJECTION, SOLUTION INTRAMUSCULAR; INTRAVENOUS
Status: DISCONTINUED | OUTPATIENT
Start: 2024-05-07 | End: 2024-05-07 | Stop reason: HOSPADM

## 2024-05-07 RX ORDER — DIPHENHYDRAMINE HYDROCHLORIDE 50 MG/ML
12.5 INJECTION INTRAMUSCULAR; INTRAVENOUS
Status: DISCONTINUED | OUTPATIENT
Start: 2024-05-07 | End: 2024-05-07 | Stop reason: HOSPADM

## 2024-05-07 RX ORDER — SODIUM CHLORIDE, SODIUM LACTATE, POTASSIUM CHLORIDE, CALCIUM CHLORIDE 600; 310; 30; 20 MG/100ML; MG/100ML; MG/100ML; MG/100ML
INJECTION, SOLUTION INTRAVENOUS CONTINUOUS
Status: DISCONTINUED | OUTPATIENT
Start: 2024-05-07 | End: 2024-05-07 | Stop reason: HOSPADM

## 2024-05-07 RX ORDER — DEXMEDETOMIDINE HYDROCHLORIDE 100 UG/ML
INJECTION, SOLUTION INTRAVENOUS PRN
Status: DISCONTINUED | OUTPATIENT
Start: 2024-05-07 | End: 2024-05-07 | Stop reason: SDUPTHER

## 2024-05-07 RX ORDER — LIDOCAINE HYDROCHLORIDE 20 MG/ML
INJECTION, SOLUTION EPIDURAL; INFILTRATION; INTRACAUDAL; PERINEURAL PRN
Status: DISCONTINUED | OUTPATIENT
Start: 2024-05-07 | End: 2024-05-07 | Stop reason: SDUPTHER

## 2024-05-07 RX ORDER — LIDOCAINE HYDROCHLORIDE 10 MG/ML
1 INJECTION, SOLUTION EPIDURAL; INFILTRATION; INTRACAUDAL; PERINEURAL
Status: DISCONTINUED | OUTPATIENT
Start: 2024-05-07 | End: 2024-05-07 | Stop reason: HOSPADM

## 2024-05-07 RX ORDER — DEXAMETHASONE SODIUM PHOSPHATE 4 MG/ML
INJECTION, SOLUTION INTRA-ARTICULAR; INTRALESIONAL; INTRAMUSCULAR; INTRAVENOUS; SOFT TISSUE PRN
Status: DISCONTINUED | OUTPATIENT
Start: 2024-05-07 | End: 2024-05-07 | Stop reason: SDUPTHER

## 2024-05-07 RX ORDER — FENTANYL CITRATE 50 UG/ML
100 INJECTION, SOLUTION INTRAMUSCULAR; INTRAVENOUS
Status: DISCONTINUED | OUTPATIENT
Start: 2024-05-07 | End: 2024-05-07 | Stop reason: HOSPADM

## 2024-05-07 RX ORDER — BUPIVACAINE HYDROCHLORIDE 5 MG/ML
30 INJECTION, SOLUTION PERINEURAL ONCE
Status: DISCONTINUED | OUTPATIENT
Start: 2024-05-07 | End: 2024-05-07 | Stop reason: HOSPADM

## 2024-05-07 RX ORDER — LIDOCAINE HYDROCHLORIDE AND EPINEPHRINE 10; 10 MG/ML; UG/ML
30 INJECTION, SOLUTION INFILTRATION; PERINEURAL ONCE
Status: DISCONTINUED | OUTPATIENT
Start: 2024-05-07 | End: 2024-05-07 | Stop reason: HOSPADM

## 2024-05-07 RX ORDER — FENTANYL CITRATE 50 UG/ML
INJECTION, SOLUTION INTRAMUSCULAR; INTRAVENOUS PRN
Status: DISCONTINUED | OUTPATIENT
Start: 2024-05-07 | End: 2024-05-07 | Stop reason: SDUPTHER

## 2024-05-07 RX ORDER — ONDANSETRON 2 MG/ML
4 INJECTION INTRAMUSCULAR; INTRAVENOUS
Status: DISCONTINUED | OUTPATIENT
Start: 2024-05-07 | End: 2024-05-07 | Stop reason: HOSPADM

## 2024-05-07 RX ORDER — ONDANSETRON 2 MG/ML
INJECTION INTRAMUSCULAR; INTRAVENOUS PRN
Status: DISCONTINUED | OUTPATIENT
Start: 2024-05-07 | End: 2024-05-07 | Stop reason: SDUPTHER

## 2024-05-07 RX ORDER — PROPOFOL 10 MG/ML
INJECTION, EMULSION INTRAVENOUS PRN
Status: DISCONTINUED | OUTPATIENT
Start: 2024-05-07 | End: 2024-05-07 | Stop reason: SDUPTHER

## 2024-05-07 RX ORDER — EPHEDRINE SULFATE/0.9% NACL/PF 50 MG/5 ML
SYRINGE (ML) INTRAVENOUS PRN
Status: DISCONTINUED | OUTPATIENT
Start: 2024-05-07 | End: 2024-05-07 | Stop reason: SDUPTHER

## 2024-05-07 RX ORDER — NALOXONE HYDROCHLORIDE 0.4 MG/ML
INJECTION, SOLUTION INTRAMUSCULAR; INTRAVENOUS; SUBCUTANEOUS PRN
Status: DISCONTINUED | OUTPATIENT
Start: 2024-05-07 | End: 2024-05-07 | Stop reason: HOSPADM

## 2024-05-07 RX ADMIN — ONDANSETRON 4 MG: 2 INJECTION INTRAMUSCULAR; INTRAVENOUS at 13:18

## 2024-05-07 RX ADMIN — LIDOCAINE HYDROCHLORIDE 80 MG: 20 INJECTION, SOLUTION EPIDURAL; INFILTRATION; INTRACAUDAL; PERINEURAL at 13:11

## 2024-05-07 RX ADMIN — Medication 10 MG: at 13:22

## 2024-05-07 RX ADMIN — WATER 2000 MG: 1 INJECTION INTRAMUSCULAR; INTRAVENOUS; SUBCUTANEOUS at 13:19

## 2024-05-07 RX ADMIN — SODIUM CHLORIDE, POTASSIUM CHLORIDE, SODIUM LACTATE AND CALCIUM CHLORIDE: 600; 310; 30; 20 INJECTION, SOLUTION INTRAVENOUS at 12:12

## 2024-05-07 RX ADMIN — DEXMEDETOMIDINE 6 MCG: 100 INJECTION, SOLUTION INTRAVENOUS at 13:11

## 2024-05-07 RX ADMIN — DEXMEDETOMIDINE 8 MCG: 100 INJECTION, SOLUTION INTRAVENOUS at 13:02

## 2024-05-07 RX ADMIN — FENTANYL CITRATE 25 MCG: 50 INJECTION, SOLUTION INTRAMUSCULAR; INTRAVENOUS at 13:02

## 2024-05-07 RX ADMIN — DEXAMETHASONE SODIUM PHOSPHATE 4 MG: 4 INJECTION, SOLUTION INTRAMUSCULAR; INTRAVENOUS at 13:18

## 2024-05-07 RX ADMIN — FENTANYL CITRATE 25 MCG: 50 INJECTION, SOLUTION INTRAMUSCULAR; INTRAVENOUS at 13:11

## 2024-05-07 RX ADMIN — PROPOFOL 120 MG: 10 INJECTION, EMULSION INTRAVENOUS at 13:11

## 2024-05-07 ASSESSMENT — PAIN - FUNCTIONAL ASSESSMENT
PAIN_FUNCTIONAL_ASSESSMENT: 0-10
PAIN_FUNCTIONAL_ASSESSMENT: 0-10

## 2024-05-07 ASSESSMENT — PAIN SCALES - GENERAL
PAINLEVEL_OUTOF10: 0
PAINLEVEL_OUTOF10: 0

## 2024-05-07 NOTE — PERIOP NOTE
POST ANESTHESIA CARE    DISCHARGE / TRANSFER NOTE  Jennifer Rob was:    [x] discharged        via   [x] Wheelchair          to [x] Private Vehicle     [] transferred    [] Carried    [] Taxi / Vehicle \"for Hire\"  [] Walk out   [] Ambulance / Medical Transportation   [] Stretcher   [] Hospital room _**_           [] Bed      Patient was escorted by:      [x] Nurse   [] Volunteer  [] Transporter / Technician  [] Parent      [] Spouse / Family /      Patient verbalized     [x] appreciation and was very pleased with care received   [] frustration with care received       throughout their stay.    Patient was discharged in     [x] pleasant mood  [] sad mood  [] mad mood .     Pain at discharge/transfer was      0  /10.    Discharge, medication and follow-up instructions were verbalized as understood prior to discharge  (if applicable for same-day procedures being discharged.)    All personal belongings have been returned to patient, and patient/family verbally confirm receiving belongings as all present.  \

## 2024-05-07 NOTE — BRIEF OP NOTE
Brief Postoperative Note      Patient: Jennifer Rob  YOB: 1945  MRN: [id number]    Date of Procedure: 5/7/2024    Pre-Op Diagnosis Codes:     * Breast nodule [N63.0]    Post-Op Diagnosis: Same       Procedure(s):  EXCISIONAL BIOPSY OF RIGHT BREAST LESION    Surgeon(s):  Omi Smith Jr, MD    Assistant:  Surgical Assistant: Amos Cortez    Anesthesia: Monitor Anesthesia Care    Estimated Blood Loss (mL): Minimal    Complications: None    Specimens:   ID Type Source Tests Collected by Time Destination   A :   Breast  Omi Smith Jr, MD 5/7/2024 1348        Implants:  * No implants in log *      Drains: * No LDAs found *    Findings:  Infection Present At Time Of Surgery (PATOS) (choose all levels that have infection present):  No infection present  Other Findings: excision right breast skin lesion, 3 cm  This procedure was not performed to treat primary cutaneous melanoma through wide local excision    Electronically signed by Omi Smith MD on 5/7/2024 at 1:49 PM

## 2024-05-07 NOTE — H&P
HISTORY OF PRESENT ILLNESS  Jennifer Rob is a 78 y.o. female.     HPI  NEW (former) patient consult referred by Dr. Loco for RIGHT breast lump. Noticed a hard lump in the RIGHT breast a few weeks ago. Denies pain or other changes to the breast.      1992: LEFT lumpectomy and ALND for \"walnut sized tumor.\" 0/32 LN positive. S/P radiation and chemotherapy (for possibly 6 months). Tamoxifen for 2 years.     02/10/2015: RIGHT lumpectomy and SNBx of gr1 invasive ductal carcinoma with DCIS. 0/2 LN involved. ER+100%/AK+30% Her2-. Clear margins.     03/24/2015 - 04/24/2015: S/p XRT  04/2015 - 04/19/2016: Anastrozole (stopped due to joint pain)  05/01/2016 - 06/13/2016: Restarted anastrozole (stopped due to joint pain)  06/27/2016 - 07/18/206: Letrozole   08/01/2016 - : Tamoxifen     Family History:        Breast imaging-   Mammogram Result (most recent):  [facility] MADI DIGITAL SCREEN BILATERAL 11/02/2023     Narrative  STUDY: Bilateral digital screening mammogram with 3-D tomosynthesis     INDICATION:  Screening.     COMPARISON: Prior mammograms dating back to 2016     BREAST COMPOSITION: There are scattered areas of fibroglandular density.     FINDINGS: Bilateral digital screening mammography was performed and is  interpreted in conjunction with a computer assisted detection (CAD) system.  Additionally, tomosynthesis of both breasts in the CC and MLO projections was  performed. There are chronic lumpectomy changes of both breasts. The RIGHT  breast upper outer quadrant fat necrosis/seroma is unchanged. No new masses or  suspicious calcifications are identified. There has been no significant change.     Impression  BI-RADS Category 2 - Benign findings.  No mammographic evidence of malignancy.     RECOMMENDATIONS:  Next screening mammogram is recommended in one year.     The patient will be notified of these results.     Past Medical History        Past Medical History:   Diagnosis Date    Breast CA (HCC) 1992     Left  Breast    Breast CA (HCC) 2015     Right Breast    Cancer (HCC) 1992     LEFT breast  followed by chemo and Tamoxifen    GERD (gastroesophageal reflux disease)      Hypercholesterolemia      Hypertension              Past Surgical History         Past Surgical History:   Procedure Laterality Date    BREAST BIOPSY Right 2015     dcis    BREAST BIOPSY Left 1992     dcis    BREAST LUMPECTOMY Left 1992     LEFT    BREAST LUMPECTOMY Right 2015    CATARACT REMOVAL Right 10/2018    HYSTERECTOMY (CERVIX STATUS UNKNOWN) N/A 1973     Took out part of both ovaries    OVARY REMOVAL Bilateral       partial    TONSILLECTOMY         age 28            Social History               Socioeconomic History    Marital status:        Spouse name: Not on file    Number of children: Not on file    Years of education: Not on file    Highest education level: Not on file   Occupational History    Not on file   Tobacco Use    Smoking status: Never    Smokeless tobacco: Never   Substance and Sexual Activity    Alcohol use: Yes       Alcohol/week: 0.8 standard drinks of alcohol    Drug use: No    Sexual activity: Not on file   Other Topics Concern    Not on file   Social History Narrative    Not on file      Social Determinants of Health      Financial Resource Strain: Not on file   Food Insecurity: Not on file   Transportation Needs: Not on file   Physical Activity: Not on file   Stress: Not on file   Social Connections: Not on file   Intimate Partner Violence: Not on file   Housing Stability: Not on file                   Current Outpatient Medications on File Prior to Visit   Medication Sig Dispense Refill    aspirin 81 MG chewable tablet Take 1 tablet by mouth daily        escitalopram (LEXAPRO) 10 MG tablet Take 1 tablet by mouth daily        metoprolol succinate (TOPROL XL) 100 MG extended release tablet Take 1 tablet by mouth daily        metoprolol succinate (TOPROL XL) 25 MG extended release tablet Take 1 tablet by mouth nightly

## 2024-05-07 NOTE — OP NOTE
[facility]          [address] [place]  [address]                            OPERATIVE REPORT      PATIENT NAME: KAREN PERES               : 1945  MED REC NO: [id number]                       ROOM: OR  ACCOUNT NO: [id number]                       ADMIT DATE: 2024  PROVIDER: Lisa Sanchez Jr, MD    DATE OF SERVICE:  2024    PREOPERATIVE DIAGNOSES:  Right breast superficial mass.    POSTOPERATIVE DIAGNOSES:  Right breast superficial mass.    PROCEDURES PERFORMED:  Excision of right breast mass, approximately 3 cm.    SURGEON:  Lisa Sanchez Jr, MD    ASSISTANT:  SUMIT Carpio.    ANESTHESIA:  General.    ESTIMATED BLOOD LOSS:  Minimal.    SPECIMENS REMOVED:  Right breast tissue.    INTRAOPERATIVE FINDINGS:  Small superficial hard, discolored breast mass on the right breast skin.     COMPLICATIONS:  None.    IMPLANTS:  None.    INDICATIONS:  The patient with above diagnosis.    DESCRIPTION OF PROCEDURE:  After satisfactory induction of general LMA anesthesia, the patient was prepped and draped in sterile fashion.  An elliptical incision was made around the breast mass and deepened to the subcutaneous tissues with Bovie cautery.  The mass was excised in its entirety and sent for permanent pathology.  All suction points were made hemostatic and the wound was anesthestized with 0.5% Marcaine and closed with interrupted 3-0 Vicryl and running subcuticular 4-0 Monocryl on the skin.  The patient tolerated the procedure well.  No complications.  She was taken to the recovery room in stable condition.        LISA SANCHEZ JR, MD      JVP/AQS  D:  2024 13:58:53  T:  2024 18:54:41  JOB #:  [id number]    CC:   Toñiot Sheets M.D.        __________, Nurse Practitioner        Megan __________BRIANNE MD   negative...

## 2024-05-07 NOTE — DISCHARGE INSTRUCTIONS
health organizations are tracking and studying this virus. Their websites contain the most up-to-date information. You'll also learn what to do if you think you may have been exposed to the virus.  U.[place]. Centers for Disease Control and Prevention (CDC): The CDC provides updated news about the disease and travel advice. The website also tells you how to prevent the spread of infection. www.cdc.gov  World Health Organization (WHO): WHO offers information about the virus outbreaks. WHO also has travel advice. www.who.int  Current as of: April 1, 2020               Content Version: 12.4  © 3816-7423 [employer].   Care instructions adapted under license by your healthcare professional. If you have questions about a medical condition or this instruction, always ask your healthcare professional. [employer] disclaims any warranty or liability for your use of this information.    AT THE COMPLETION OF DISCHARGE INSTRUCTION REVIEW, WE VERIFY:  The discharge information has been reviewed with the patient and caregiver.    Questions have been asked and answered meeting patient and caregiver expectations.  The patient and caregiver verbalized understanding.    Your discharge nurse was Carlos MUNGUIA, IVONNE-BC       Board Certified - Pain Management      CONTENTS FOUND IN YOUR DISCHARGE ENVELOPE:  [x]     Surgeon and Hospital Discharge Instructions  []     [facility] Care Provider Card  []     Medication & Side Effect Guide            (your newly prescribed medications have been marked/highlighted showing the most common side effects from the classes of drugs on your prescriptions)  [x]     Medication Prescription(s)     x 1         ( [x] These have been sent electronically to your pharmacy by your surgeon,   - OR -                       your surgeon has already provided these to you during a previous/pre-op office visit)  []     Pain block and/or block with On-Q Catheter from Anesthesia Service  (information included in your instructions above)         []    [facility] Education Information  []     Physical Therapy Prescription  []     Follow-up Appointment Cards  []     Surgery-related Pictures/Media  []     Medical device information sheets/pamphlets from their    []     School/work excuse note.  []     /parent work excuse note.      The following personal items collected during your admission for safe keeping are returned to you:     Dental Appliance:    Vision:    Hearing Aid:    Jewelry:    Clothing:    Other Valuables:    Valuables sent to safe: Dose (mL/hr) Propofol : *120 mg

## 2024-05-07 NOTE — ANESTHESIA POSTPROCEDURE EVALUATION
Department of Anesthesiology  Postprocedure Note    Patient: Jennifer Rob  MRN: [id number]  YOB: 1945  Date of evaluation: 5/7/2024    Procedure Summary       Date: 05/07/24 Room / Location: [facility] MAIN OR  / [facility] MAIN OR    Anesthesia Start: 1302 Anesthesia Stop: 1357    Procedure: EXCISIONAL BIOPSY OF RIGHT BREAST LESION (Right: Breast) Diagnosis:       Breast nodule      (Breast nodule [N63.0])    Surgeons: Omi Smith Jr, MD Responsible Provider: Graham Mckinney MD    Anesthesia Type: General ASA Status: 2            Anesthesia Type: General    Lillie Phase I: Lillie Score: 9    Lillie Phase II: Lillie Score: 10    Anesthesia Post Evaluation    Patient location during evaluation: PACU  Patient participation: complete - patient participated  Level of consciousness: awake  Airway patency: patent  Nausea & Vomiting: no vomiting and no nausea  Cardiovascular status: hemodynamically stable  Respiratory status: acceptable  Hydration status: stable  Pain management: adequate    No notable events documented.

## 2024-05-07 NOTE — ANESTHESIA PRE PROCEDURE
2 mg  2 mg IntraVENous Once PRN Andrew Blackwell MD       • BUPivacaine (MARCAINE) 0.5 % injection 150 mg  30 mL IntraDERmal Once Omi Smith Jr, MD       • ceFAZolin (ANCEF) 2,000 mg in sterile water 20 mL IV syringe  2,000 mg IntraVENous 30 Min Pre-Op Omi Smith Jr, MD       • lidocaine-EPINEPHrine 1 %-1:[id number] injection 30 mL  30 mL IntraDERmal Once Omi Smith Jr, MD           Allergies:    Allergies   Allergen Reactions   • Sulfa Antibiotics Rash       Problem List:    Patient Active Problem List   Diagnosis Code   • Malignant neoplasm of upper-outer quadrant of right female breast (HCC) C50.411   • Advanced directives, counseling/discussion Z71.89   • History of left breast cancer Z85.3   • Breast cancer, stage 1 (HCC) C50.919   • Breast nodule N63.0       Past Medical History:        Diagnosis Date   • A-fib (HCC)    • Blind right eye    • Breast CA (HCC) 1992    Left Breast   • Breast CA (HCC) 2015    Right Breast   • Cancer (HCC) 1992    LEFT breast  followed by chemo and Tamoxifen   • GERD (gastroesophageal reflux disease)    • History of blood transfusion 1971    following a ruptured ovarian cyst   • Hypercholesterolemia    • Hypertension        Past Surgical History:        Procedure Laterality Date   • BREAST BIOPSY Right 2015    dcis   • BREAST BIOPSY Left 1992    dcis   • BREAST LUMPECTOMY Left 1992    LEFT   • BREAST LUMPECTOMY Right 2015   • CARPAL TUNNEL RELEASE Right 2023   • CATARACT REMOVAL Right 10/2018   • COLONOSCOPY     • ENDOMETRIAL ABLATION     • HYSTERECTOMY (CERVIX STATUS UNKNOWN) N/A 1973    Took out part of both ovaries   • HYSTEROSCOPY     • OVARIAN CYST SURGERY  1971   • OVARY REMOVAL Bilateral     partial   • TONSILLECTOMY      age 28       Social History:    Social History     Tobacco Use   • Smoking status: Never   • Smokeless tobacco: Never   Substance Use Topics   • Alcohol use: Yes     Alcohol/week: 2.0 standard drinks of alcohol     Types: 2 Cans of

## 2024-05-10 ENCOUNTER — TELEPHONE (OUTPATIENT)
Age: 79
End: 2024-05-10

## 2024-05-13 ENCOUNTER — OFFICE VISIT (OUTPATIENT)
Age: 79
End: 2024-05-13

## 2024-05-13 VITALS — WEIGHT: 175 LBS | HEIGHT: 61 IN | BODY MASS INDEX: 33.04 KG/M2

## 2024-05-13 DIAGNOSIS — Z98.890 STATUS POST EXCISIONAL BIOPSY: Primary | ICD-10-CM

## 2024-05-13 PROCEDURE — 99024 POSTOP FOLLOW-UP VISIT: CPT | Performed by: SURGERY

## 2024-05-13 NOTE — PROGRESS NOTES
HISTORY OF PRESENT ILLNESS  Jnenifer Rob is a 78 y.o. female     HPI ESTABLISHED patient here for POST Op visit for S/P RIGHT breast excision. Denies any pain or discomfort. Had some nausea this morning.           1992: LEFT lumpectomy and ALND for \"walnut sized tumor.\" 0/32 LN positive. S/P radiation and chemotherapy (for possibly 6 months). Tamoxifen for 2 years.     02/10/2015: RIGHT lumpectomy and SNBx of gr1 invasive ductal carcinoma with DCIS. 0/2 LN involved. ER+100%/ME+30% Her2-. Clear margins.     03/24/2015 - 04/24/2015: S/p XRT  04/2015 - 04/19/2016: Anastrozole (stopped due to joint pain)  05/01/2016 - 06/13/2016: Restarted anastrozole (stopped due to joint pain)  06/27/2016 - 07/18/206: Letrozole   08/01/2016 - : Tamoxifen     5/7/24: Excision bx of the RIGHT breast         Review of Systems      Physical Exam       ASSESSMENT and PLAN  {Assessment and Plan Chronic Disease:[id number]}

## 2024-05-13 NOTE — PROGRESS NOTES
HISTORY OF PRESENT ILLNESS  Jennifer Rob is a 78 y.o. female.    HPI  ESTABLISHED patient here for POST Op visit for S/P RIGHT breast excision. Denies any pain or discomfort. Had some nausea this morning.     1992: LEFT lumpectomy and ALND for \"walnut sized tumor.\" 0/32 LN positive. S/P radiation and chemotherapy (for possibly 6 months). Tamoxifen for 2 years.    02/10/2015: RIGHT lumpectomy and SNBx of gr1 invasive ductal carcinoma with DCIS. 0/2 LN involved. ER+100%/WA+30% Her2-. Clear margins.     03/24/2015 - 04/24/2015: S/p XRT  04/2015 - 04/19/2016: Anastrozole (stopped due to joint pain)  05/01/2016 - 06/13/2016: Restarted anastrozole (stopped due to joint pain)  06/27/2016 - 07/18/206: Letrozole   08/01/2016 - : Tamoxifen    5/7/24: Excision bx of the RIGHT breast: PATH pending.     Past Medical History:   Diagnosis Date    A-fib (HCC)     Blind right eye     Breast CA (HCC) 1992    Left Breast    Breast CA (HCC) 2015    Right Breast    Cancer (HCC) 1992    LEFT breast  followed by chemo and Tamoxifen    GERD (gastroesophageal reflux disease)     History of blood transfusion 1971    following a ruptured ovarian cyst    Hypercholesterolemia     Hypertension        Past Surgical History:   Procedure Laterality Date    BREAST BIOPSY Right 2015    dcis    BREAST BIOPSY Left 1992    dcis    BREAST LUMPECTOMY Left 1992    LEFT    BREAST LUMPECTOMY Right 2015    BREAST SURGERY Right 5/7/2024    EXCISIONAL BIOPSY OF RIGHT BREAST LESION performed by Omi Smith Jr, MD at [facility] MAIN OR    CARPAL TUNNEL RELEASE Right 2023    CATARACT REMOVAL Right 10/2018    COLONOSCOPY      ENDOMETRIAL ABLATION      HYSTERECTOMY (CERVIX STATUS UNKNOWN) N/A 1973    Took out part of both ovaries    HYSTEROSCOPY      OVARIAN CYST SURGERY  1971    OVARY REMOVAL Bilateral     partial    TONSILLECTOMY      age 28       Social History     Socioeconomic History    Marital status:      Spouse name: Not on file    Number of

## 2024-05-15 ENCOUNTER — TELEPHONE (OUTPATIENT)
Age: 79
End: 2024-05-15

## 2024-05-15 NOTE — TELEPHONE ENCOUNTER
Patient left message inquiring about pathology reports. Per Dr. Smith, we have been in touch with the pathologist and are still awaiting the final results. Per Dr. Smith, informed patient it does not look like any malignancy per the pathologist, but Dr. Smith will call as soon as the reuslts are in. Patient verbalized understanding and was appreciative of call.

## 2024-05-17 ENCOUNTER — CLINICAL DOCUMENTATION (OUTPATIENT)
Age: 79
End: 2024-05-17

## 2024-05-17 NOTE — PROGRESS NOTES
I called Abhinav supervisor at [facility] and he has informed me additional stains have been ordered on the patient's breast biopsy specimen. I called the patient and left a message on her voicemail letting her know the update.

## 2024-05-21 ENCOUNTER — TELEPHONE (OUTPATIENT)
Age: 79
End: 2024-05-21

## 2024-05-22 ENCOUNTER — TELEPHONE (OUTPATIENT)
Age: 79
End: 2024-05-22

## 2024-05-22 NOTE — TELEPHONE ENCOUNTER
Received a request on 05/21 from [facility] office for medical records. Requesting insuance card, pathology and imaging reports faxed to [phone]

## 2024-05-29 ENCOUNTER — TELEPHONE (OUTPATIENT)
Age: 79
End: 2024-05-29

## 2024-12-12 ENCOUNTER — TRANSCRIBE ORDERS (OUTPATIENT)
Facility: HOSPITAL | Age: 79
End: 2024-12-12

## 2024-12-12 DIAGNOSIS — Z12.31 VISIT FOR SCREENING MAMMOGRAM: Primary | ICD-10-CM

## 2024-12-16 ENCOUNTER — HOSPITAL ENCOUNTER (OUTPATIENT)
Facility: HOSPITAL | Age: 79
Discharge: HOME | End: 2024-12-19
Payer: MEDICARE

## 2024-12-16 VITALS — HEIGHT: 61 IN | WEIGHT: 167 LBS | BODY MASS INDEX: 31.53 KG/M2

## 2024-12-16 DIAGNOSIS — Z12.31 VISIT FOR SCREENING MAMMOGRAM: ICD-10-CM

## 2024-12-16 PROCEDURE — 77063 BREAST TOMOSYNTHESIS BI: CPT

## (undated) DEVICE — BLADE ES ELASTOMERIC COAT INSUL DURABLE BEND UPTO 90DEG

## (undated) DEVICE — SOLUTION IRRIG 500ML 0.9% SOD CHLO USP POUR PLAS BTL

## (undated) DEVICE — SUTURE VICRYL + SZ 2-0 L27IN ABSRB WHT SH 1/2 CIR TAPERCUT VCP417H

## (undated) DEVICE — CHEST PACK-SFMCASU: Brand: MEDLINE INDUSTRIES, INC.

## (undated) DEVICE — SUTURE MONOCRYL + SZ 4-0 L27IN ABSRB UD L19MM PS-2 3/8 CIR MCP426H

## (undated) DEVICE — SOLUTION IRRIG 1000ML STRL H2O USP PLAS POUR BTL

## (undated) DEVICE — HYPODERMIC SAFETY NEEDLE: Brand: MAGELLAN

## (undated) DEVICE — TRANSFER SET 3": Brand: MEDLINE INDUSTRIES, INC.

## (undated) DEVICE — GLOVE ORANGE PI 7 1/2   MSG9075

## (undated) DEVICE — PENCIL SMK EVAC L10FT DIA95MM TBNG NONSTICK W ADPT TO 22MM